# Patient Record
Sex: FEMALE | Race: WHITE | NOT HISPANIC OR LATINO | URBAN - METROPOLITAN AREA
[De-identification: names, ages, dates, MRNs, and addresses within clinical notes are randomized per-mention and may not be internally consistent; named-entity substitution may affect disease eponyms.]

---

## 2018-03-02 ENCOUNTER — OFFICE VISIT (OUTPATIENT)
Dept: URGENT CARE | Facility: CLINIC | Age: 53
End: 2018-03-02
Payer: COMMERCIAL

## 2018-03-02 VITALS
BODY MASS INDEX: 26.55 KG/M2 | DIASTOLIC BLOOD PRESSURE: 74 MMHG | WEIGHT: 165.2 LBS | HEIGHT: 66 IN | OXYGEN SATURATION: 98 % | SYSTOLIC BLOOD PRESSURE: 120 MMHG | HEART RATE: 72 BPM | RESPIRATION RATE: 16 BRPM | TEMPERATURE: 98.9 F

## 2018-03-02 DIAGNOSIS — E03.9 HYPOTHYROIDISM, UNSPECIFIED TYPE: ICD-10-CM

## 2018-03-02 PROCEDURE — 99214 OFFICE O/P EST MOD 30 MIN: CPT | Performed by: FAMILY MEDICINE

## 2018-03-02 RX ORDER — LEVOTHYROXINE SODIUM 0.07 MG/1
75 TABLET ORAL
COMMUNITY
End: 2018-03-02 | Stop reason: SDUPTHER

## 2018-03-02 RX ORDER — LEVOTHYROXINE SODIUM 0.07 MG/1
75 TABLET ORAL
Qty: 30 TAB | Refills: 3 | Status: SHIPPED | OUTPATIENT
Start: 2018-03-02 | End: 2018-03-12 | Stop reason: SDUPTHER

## 2018-03-02 ASSESSMENT — ENCOUNTER SYMPTOMS
FEVER: 0
CHILLS: 0
DIZZINESS: 0
ORTHOPNEA: 0
HEMOPTYSIS: 0
SHORTNESS OF BREATH: 0
FOCAL WEAKNESS: 0

## 2018-03-02 ASSESSMENT — PATIENT HEALTH QUESTIONNAIRE - PHQ9: CLINICAL INTERPRETATION OF PHQ2 SCORE: 0

## 2018-03-02 NOTE — PROGRESS NOTES
"Subjective:      Jacinda Pittman is a 52 y.o. female who presents with Medication Refill (levothyroxine- needs about 10 pills to hold her over, March 12th appointment with Dr Gonzalez)    Chief Complaint   Patient presents with   • Medication Refill     levothyroxine- needs about 10 pills to hold her over, March 12th appointment with Dr Gonzalez        - This is a very pleasant 52 y.o. female with complaints of getting low on thyroid med, only 2-3 days left, wont be able to see new pcp for another month. Has been doing well on current dose of synthroid           ALLERGIES:  Patient has no known allergies.     PMH:  No past medical history on file.     MEDS:    Current Outpatient Prescriptions:   •  levothyroxine (SYNTHROID) 75 MCG Tab, Take 1 Tab by mouth Every morning on an empty stomach., Disp: 30 Tab, Rfl: 3  •  PEPCID 20 MG PO TABS, 20 mg by Oral route BID (RT). Take one pill, by mouth, two times a day, Disp: , Rfl:     ** I have documented what I find to be significant in regards to past medical, social, family and surgical history  in my HPI or under PMH/PSH/FH review section, otherwise it is contributory **           HPI    Review of Systems   Constitutional: Negative for chills and fever.   Respiratory: Negative for hemoptysis and shortness of breath.    Cardiovascular: Negative for chest pain and orthopnea.   Neurological: Negative for dizziness and focal weakness.          Objective:     /74   Pulse 72   Temp 37.2 °C (98.9 °F)   Resp 16   Ht 1.676 m (5' 6\")   Wt 74.9 kg (165 lb 3.2 oz)   SpO2 98%   Breastfeeding? No   BMI 26.66 kg/m²      Physical Exam   Constitutional: She appears well-developed. No distress.   HENT:   Head: Normocephalic and atraumatic.   Neck: Neck supple.   Cardiovascular: Regular rhythm.    No murmur heard.  Pulmonary/Chest: Effort normal. No respiratory distress.   Neurological: She is alert. She exhibits normal muscle tone.   Skin: Skin is warm and dry.   Psychiatric: She " has a normal mood and affect. Judgment normal.   Nursing note and vitals reviewed.              Assessment/Plan:         1. Hypothyroidism, unspecified type  levothyroxine (SYNTHROID) 75 MCG Tab             Dx & d/c instructions discussed w/ patient and/or family members. Follow up w/ Prvt Dr or here in 3-4 days if not getting better, sooner if needed,  ER if worse and UC/PCP unavailable.        Possible side effects (i.e. Rash, GI upset/constipation, sedation, elevation of BP or sugars) of any medications given discussed.

## 2018-03-12 ENCOUNTER — OFFICE VISIT (OUTPATIENT)
Dept: MEDICAL GROUP | Facility: MEDICAL CENTER | Age: 53
End: 2018-03-12
Payer: COMMERCIAL

## 2018-03-12 VITALS
BODY MASS INDEX: 26.82 KG/M2 | HEART RATE: 73 BPM | SYSTOLIC BLOOD PRESSURE: 110 MMHG | WEIGHT: 166.89 LBS | HEIGHT: 66 IN | TEMPERATURE: 98.9 F | DIASTOLIC BLOOD PRESSURE: 62 MMHG | OXYGEN SATURATION: 96 %

## 2018-03-12 DIAGNOSIS — R73.01 IFG (IMPAIRED FASTING GLUCOSE): ICD-10-CM

## 2018-03-12 DIAGNOSIS — Z76.89 ENCOUNTER TO ESTABLISH CARE: ICD-10-CM

## 2018-03-12 DIAGNOSIS — E03.9 ACQUIRED HYPOTHYROIDISM: ICD-10-CM

## 2018-03-12 DIAGNOSIS — I34.1 MVP (MITRAL VALVE PROLAPSE): ICD-10-CM

## 2018-03-12 DIAGNOSIS — Z12.39 SCREENING FOR MALIGNANT NEOPLASM OF BREAST: ICD-10-CM

## 2018-03-12 DIAGNOSIS — Z12.11 SCREENING FOR MALIGNANT NEOPLASM OF COLON: ICD-10-CM

## 2018-03-12 DIAGNOSIS — Z00.00 HEALTH CARE MAINTENANCE: ICD-10-CM

## 2018-03-12 PROCEDURE — 99215 OFFICE O/P EST HI 40 MIN: CPT | Performed by: INTERNAL MEDICINE

## 2018-03-12 RX ORDER — LEVOTHYROXINE SODIUM 0.07 MG/1
75 TABLET ORAL
Qty: 30 TAB | Refills: 2 | Status: SHIPPED | OUTPATIENT
Start: 2018-03-12 | End: 2018-08-13 | Stop reason: SDUPTHER

## 2018-03-13 NOTE — PROGRESS NOTES
CHIEF COMPLAINT  Chief Complaint   Patient presents with   • Thyroid Problem     Levothyroxine   • Establish Care     NP     HPI  Patient is a 52 y.o. female patient who presents today for the following     - She has not seen M.D. for 2-3 years.  No recent TSH.    HYPOTHYROIDISM  DG/Onset:   Levothyroxine, 75 mcg, taking daily early in am, before any po intake.  No temperature intolerance. No change in weight,  hair/skin quality, BMs.   No tremors, weakness.  No peripheral swelling.  No mood changes.  FH: N    IFG  The patient had elevated FBG.  No polydipsia, polyphagia, polyuria.  No abdominal pain, weight loss, fatigue.  Diet: regular  Exercise: limited  FH of DM: N    Reviewed PMH, PSH, FH, SH, ALL, HCM/IMM, updated  Reviewed MEDS, updated    Patient Active Problem List    Diagnosis Date Noted   • Hypothyroidism 03/02/2018     CURRENT MEDICATIONS  Current Outpatient Prescriptions   Medication Sig Dispense Refill   • levothyroxine (SYNTHROID) 75 MCG Tab Take 1 Tab by mouth Every morning on an empty stomach. 30 Tab 2   • PEPCID 20 MG PO TABS 20 mg by Oral route BID (RT). Take one pill, by mouth, two times a day       No current facility-administered medications for this visit.      ALLERGIES  Allergies: Patient has no known allergies.  PAST MEDICAL HISTORY  No past medical history on file.  SURGICAL HISTORY  She  has no past surgical history on file.  SOCIAL HISTORY  Social History   Substance Use Topics   • Smoking status: Never Smoker   • Smokeless tobacco: Never Used   • Alcohol use No     Social History     Social History Narrative   • No narrative on file     FAMILY HISTORY  Family History   Problem Relation Age of Onset   • No Known Problems Mother    • GI Father      liver   • Thyroid Neg Hx      Family Status   Relation Status   • Mother Alive   • Father Alive   • Neg Hx      ROS   Constitutional: Negative for fever, chills.  HENT: Negative for congestion, sore throat.  Eyes: Negative for blurred vision.  "  Respiratory: Negative for cough, shortness of breath.  Cardiovascular: Negative for chest pain, palpitations.   Gastrointestinal: Negative for heartburn, nausea, abdominal pain.   Genitourinary: Negative for dysuria.  Musculoskeletal: Negative for significant myalgias, back pain and joint pain.   Skin: Negative for rash and itching.   Neuro: Negative for dizziness, weakness and headaches.   Endo/Heme/Allergies: Does not bruise/bleed easily. And per HPI.  Psychiatric/Behavioral: Negative for depression, anxiety    PHYSICAL EXAM   Blood pressure 110/62, pulse 73, temperature 37.2 °C (98.9 °F), height 1.676 m (5' 6\"), weight 75.7 kg (166 lb 14.2 oz), SpO2 96 %. Body mass index is 26.94 kg/m².  General:  NAD, well appearing  HEENT:   NC/AT, PERRLA, EOMI, TMs are clear. Oropharyngeal mucosa is pink,  without lesions;  no cervical / supraclavicular  lymphadenopathy, no thyromegaly.    Cardiovascular: RRR.   No m/r/g. No carotid bruits .      Lungs:   CTAB, no w/r/r, no respiratory distress.  Abdomen: Soft, NT/ND + BS; no suprapubic tenderness; no masses or hepatosplenomegaly.  Extremities:  2+ DP and radial pulses bilaterally.  No c/c/e.   Skin:  Warm, dry.  No erythema. No rash.   Neurologic: Alert & oriented x 3. No focal deficits.  Psychiatric:  Affect normal, mood normal, judgment normal.    LABS     Existing Labs in [facility] are reviewed and discussed with a patient    No results found for: CHOLSTRLTOT, LDL, HDL, TRIGLYCERIDE    Lab Results   Component Value Date/Time    SODIUM 139 06/15/2008 04:05 AM    POTASSIUM 3.9 06/15/2008 04:05 AM    CHLORIDE 111 06/15/2008 04:05 AM    CO2 25 06/15/2008 04:05 AM    GLUCOSE 132 (H) 06/15/2008 04:05 AM    BUN 7 (L) 06/15/2008 04:05 AM    CREATININE 1.0 06/15/2008 04:05 AM      Ref. Range 6/14/2008 07:10   TSH  0.350 - 5.500 uIU/mL 6.330 (H)     IMAGING     Reviewed the echocardiography from 6/16/2008:  IMPRESSION:  1. Technically adequate; no comparison available.  2. Premature " ventricular contractions.  3. Structurally normal heart, ejection fraction estimated at 55 to     60%.  4. Mild bileaflet mitral valve prolapse with trivial regurgitation.  5. Normal right atrial pressure.    ASSESMENT AND PLAN        1. Acquired hypothyroidism  Asymptomatic, continue current dose of levothyroxine    - levothyroxine (SYNTHROID) 75 MCG Tab; Take 1 Tab by mouth Every morning on an empty stomach.  Dispense: 30 Tab; Refill: 2    2. IFG (impaired fasting glucose)  Advise low-calorie diet, daily exercise, weight control  - COMP METABOLIC PANEL; Future  - HEMOGLOBIN A1C; Future  - MICROALBUMIN CREAT RATIO URINE; Future    3. MVP (mitral valve prolapse)  Asymptomatic    4. Health care maintenance  Pending records    5. Encounter to establish care  Reviewed PMH, PSH, FH, SH, ALL, MEDS, HCM/IMM.   Advised healthy habits, diet, exercise.    6. Screening for malignant neoplasm of breast  - MA-SCREEN MAMMO W/CAD-BILAT; Future    7. Screening for malignant neoplasm of colon  - REFERRAL TO GI FOR COLONOSCOPY    Counseling:   - Smoking:  Nonsmoker    Followup: Return in about 3 months (around 6/12/2018) for Short.    All questions are answered.    Please note that this dictation was created using voice recognition software, and that there might be errors of parth and possibly content.

## 2018-04-02 ENCOUNTER — HOSPITAL ENCOUNTER (OUTPATIENT)
Dept: RADIOLOGY | Facility: MEDICAL CENTER | Age: 53
End: 2018-04-02
Attending: INTERNAL MEDICINE
Payer: COMMERCIAL

## 2018-04-02 DIAGNOSIS — Z12.39 SCREENING FOR MALIGNANT NEOPLASM OF BREAST: ICD-10-CM

## 2018-04-02 PROCEDURE — 77067 SCR MAMMO BI INCL CAD: CPT

## 2018-06-11 ENCOUNTER — HOSPITAL ENCOUNTER (OUTPATIENT)
Dept: LAB | Facility: MEDICAL CENTER | Age: 53
End: 2018-06-11
Attending: INTERNAL MEDICINE
Payer: COMMERCIAL

## 2018-06-11 DIAGNOSIS — R73.01 IFG (IMPAIRED FASTING GLUCOSE): ICD-10-CM

## 2018-06-11 LAB
ALBUMIN SERPL BCP-MCNC: 3.7 G/DL (ref 3.2–4.9)
ALBUMIN/GLOB SERPL: 1.2 G/DL
ALP SERPL-CCNC: 50 U/L (ref 30–99)
ALT SERPL-CCNC: 12 U/L (ref 2–50)
ANION GAP SERPL CALC-SCNC: 8 MMOL/L (ref 0–11.9)
AST SERPL-CCNC: 13 U/L (ref 12–45)
BILIRUB SERPL-MCNC: 0.6 MG/DL (ref 0.1–1.5)
BUN SERPL-MCNC: 20 MG/DL (ref 8–22)
CALCIUM SERPL-MCNC: 8.9 MG/DL (ref 8.5–10.5)
CHLORIDE SERPL-SCNC: 106 MMOL/L (ref 96–112)
CO2 SERPL-SCNC: 26 MMOL/L (ref 20–33)
CREAT SERPL-MCNC: 1.05 MG/DL (ref 0.5–1.4)
CREAT UR-MCNC: 65 MG/DL
EST. AVERAGE GLUCOSE BLD GHB EST-MCNC: 126 MG/DL
GLOBULIN SER CALC-MCNC: 3.2 G/DL (ref 1.9–3.5)
GLUCOSE SERPL-MCNC: 84 MG/DL (ref 65–99)
HBA1C MFR BLD: 6 % (ref 0–5.6)
MICROALBUMIN UR-MCNC: <0.7 MG/DL
MICROALBUMIN/CREAT UR: NORMAL MG/G (ref 0–30)
POTASSIUM SERPL-SCNC: 3.9 MMOL/L (ref 3.6–5.5)
PROT SERPL-MCNC: 6.9 G/DL (ref 6–8.2)
SODIUM SERPL-SCNC: 140 MMOL/L (ref 135–145)

## 2018-06-11 PROCEDURE — 36415 COLL VENOUS BLD VENIPUNCTURE: CPT

## 2018-06-11 PROCEDURE — 80053 COMPREHEN METABOLIC PANEL: CPT

## 2018-06-11 PROCEDURE — 82043 UR ALBUMIN QUANTITATIVE: CPT

## 2018-06-11 PROCEDURE — 83036 HEMOGLOBIN GLYCOSYLATED A1C: CPT

## 2018-06-11 PROCEDURE — 82570 ASSAY OF URINE CREATININE: CPT

## 2018-06-15 ENCOUNTER — OFFICE VISIT (OUTPATIENT)
Dept: MEDICAL GROUP | Facility: MEDICAL CENTER | Age: 53
End: 2018-06-15
Payer: COMMERCIAL

## 2018-06-15 ENCOUNTER — HOSPITAL ENCOUNTER (OUTPATIENT)
Facility: MEDICAL CENTER | Age: 53
End: 2018-06-15
Attending: INTERNAL MEDICINE
Payer: COMMERCIAL

## 2018-06-15 VITALS
DIASTOLIC BLOOD PRESSURE: 64 MMHG | SYSTOLIC BLOOD PRESSURE: 108 MMHG | OXYGEN SATURATION: 95 % | HEART RATE: 80 BPM | HEIGHT: 66 IN | BODY MASS INDEX: 26.61 KG/M2 | TEMPERATURE: 98 F | WEIGHT: 165.57 LBS

## 2018-06-15 DIAGNOSIS — Z01.419 WELL FEMALE EXAM WITH ROUTINE GYNECOLOGICAL EXAM: ICD-10-CM

## 2018-06-15 DIAGNOSIS — Z12.11 SCREENING FOR MALIGNANT NEOPLASM OF COLON: ICD-10-CM

## 2018-06-15 DIAGNOSIS — E03.9 ACQUIRED HYPOTHYROIDISM: ICD-10-CM

## 2018-06-15 DIAGNOSIS — R73.01 IFG (IMPAIRED FASTING GLUCOSE): ICD-10-CM

## 2018-06-15 DIAGNOSIS — Z12.4 SCREENING FOR MALIGNANT NEOPLASM OF CERVIX: ICD-10-CM

## 2018-06-15 DIAGNOSIS — N95.1 PERIMENOPAUSE: ICD-10-CM

## 2018-06-15 DIAGNOSIS — R94.4 DECREASED GFR: ICD-10-CM

## 2018-06-15 DIAGNOSIS — I34.1 MVP (MITRAL VALVE PROLAPSE): ICD-10-CM

## 2018-06-15 PROBLEM — R79.89 ABNORMAL TSH: Status: ACTIVE | Noted: 2018-06-15

## 2018-06-15 PROCEDURE — 87624 HPV HI-RISK TYP POOLED RSLT: CPT

## 2018-06-15 PROCEDURE — 99214 OFFICE O/P EST MOD 30 MIN: CPT | Performed by: INTERNAL MEDICINE

## 2018-06-15 PROCEDURE — 88175 CYTOPATH C/V AUTO FLUID REDO: CPT

## 2018-06-16 NOTE — PROGRESS NOTES
CHIEF COMPLIANT:  Jacinda Pittman is a 53 y.o. female who presents for annual exam, accompanied by her     -They wanted to discuss about abnormal labs and MVP    Recommendations:  Regular exercise at least 4 days a week  Diet: advised balanced diet  Dental exam at least 1-2 times per year  Sunscreen use: advised    Immunizations:  TdaP:  2015    Colonoscopy: Given order  Bone density test:     GYN  Previous PAP:  normal   Abnormal PAP: no  Last Mammography: 4/2018    Menarche/perimenopause      Perimenopause, last.  It was 2 months ago, medium bleeding, no excessive cramping.  Denies hot flushes, sweating, vaginal dryness, mood swings.     HYPOTHYROIDISM  Levothyroxine, 75 mcg, taking daily early in am, before any po intake.  No temperature intolerance. No change in weight, hair/skin quality, BMs.   No tremors, weakness.  No peripheral swelling.  No mood changes.  FH: N     IFG  Internal course:  -A1c came back at 6.    No polydipsia, polyphagia, polyuria.  No abdominal pain, weight loss, fatigue.  Diet: regular  Exercise: limited  No medications.  FH of DM: N    MVP  The patient has a remote diagnosis of MVP.   She has not here palpitations, irregular heartbeats, chest pain or pressure   -Exertional shortness of breath and chest pain.   Echocardiography was remote.    Decreased GFR  New problem.     The patient had GFR 55, with normal creatinine and electrolytes.   No consistent NSAIDs use.  Low fluid intake.    CURRENT MEDICATIONS  Current Outpatient Prescriptions   Medication Sig Dispense Refill   • levothyroxine (SYNTHROID) 75 MCG Tab Take 1 Tab by mouth Every morning on an empty stomach. 30 Tab 2     No current facility-administered medications for this visit.      ALLERGIES  Allergies: Patient has no known allergies.  PAST MEDICAL HISTORY  She  has a past medical history of Hypothyroid and MVP (mitral valve prolapse) (3/12/2018). She also has no past medical history of Angina; Arrhythmia;  Arthritis; ASTHMA; Backpain; Bronchitis; CAD (coronary artery disease); Cancer (HCC); CATARACT; Congestive heart failure (HCC); COPD; Diabetes; Dialysis; Glaucoma; Hypertension; Indigestion; Infectious disease; Jaundice; Liver disease; Myocardial infarct (HCC); Other specified symptom associated with female genital organs; Pacemaker; Personal history of venous thrombosis and embolism; Pneumonia; Psychiatric disorder; Psychiatric problem; Renal disorder; Rheumatic fever; Seizure (HCC); Seizure disorder (HCC); Stroke (HCC); Unspecified disorder of thyroid; Unspecified hemorrhagic conditions; or Unspecified urinary incontinence.  SURGICAL HISTORY  She  has no past surgical history on file.  SOCIAL HISTORY  Social History   Substance Use Topics   • Smoking status: Never Smoker   • Smokeless tobacco: Never Used   • Alcohol use No     Social History     Social History Narrative   • No narrative on file     FAMILY HISTORY  Family History   Problem Relation Age of Onset   • No Known Problems Mother    • GI Father      liver   • Thyroid Neg Hx    • Diabetes Neg Hx      Family Status   Relation Status   • Mother Alive   • Father Alive   • Neg Hx      REVIEW OF SYSTEMS  Constitutional: Denies fever, chills, or sweats  Skin: negative for rash, scaling, itching, pigmentation, hair or nail changes.  Eyes: negative for visual blurring, double vision, eye pain, floaters and discharge from eyes  ENT: negative for tinnitus, vertigo, bleeding gums, dental problem and hoarseness, frequent URI's, sinus trouble, persistent sore throat  Respiratory: negative for persistent cough, hemoptysis, dyspnea, recurrent pneumonia or bronchitis, asthma and wheezing  Cardiovascular: negative for palpitations, tachycardia, irregular heart beat, chest pain, or peripheral edema.  Breast: Denies breast tenderness, mass, discharge, changes in size or contour, or abnormal cyclic discomfort.  Gastrointestinal: negative for poor appetite, dysphagia, nausea,  "heartburn or reflux, abdominal pain, hemorrhoids, constipation or diarrhea  Genitourinary: negative for dysuria, frequency, hesitancy, incontinence, sexually transmitted disease, abnormal vaginal discharge/bleeding, dysparunia   Musculoskeletal: negative for joint swelling and muscle pain/ soreness  Neuro: negative for migraine headaches, involuntary movements or tremor  Psychiatric: negative for excessive alcohol consumption or illegal drug use, sleep problems, anxiety, depression, sexual difficulties  Hematologic/Lymphatic/Immunologic: negative for anemia, unusual bruising, swollen glands, HIV risk factors  Endocrine: negative for temperature intolerance, polydipsia, polyuria.     PHYSICAL EXAMINATION:  Blood pressure 108/64, pulse 80, temperature 36.7 °C (98 °F), height 1.676 m (5' 6\"), weight 75.1 kg (165 lb 9.1 oz), SpO2 95 %.  Body mass index is 26.72 kg/m².  Wt Readings from Last 4 Encounters:   06/15/18 75.1 kg (165 lb 9.1 oz)   03/12/18 75.7 kg (166 lb 14.2 oz)   03/02/18 74.9 kg (165 lb 3.2 oz)   06/15/08 58.5 kg (129 lb)     General appearance:healthy, well developed, well nourished, well-groomed  Psych: alert, no distress, cooperative. Eye contact good, speech goal directed. Affect calm.   Eyes: conjunctivae and sclerae normal, lids and lashes normal, EOMI, PERRLA  ENT: Ears: external ears normal to inspection, canals clear. TMs pearly gray with normal light reflex and anatomic landmarks, Nose/Sinuses: nose shows no deformity, asymmetry, or inflammation, nasal mucosa normal, no sinus tenderness,   Oropharynx: lips normal without lesions, buccal mucosa normal, gums healthy, teeth intact, non-carious, palate normal, tongue midline and normal  Neck: no asymmetry, masses, adenopathy. Thyroid normal to palpation, carotids without bruits, no jugular venous distention  Lungs: clear to auscultation with good excursion, Normal respiratory rate. Chest symmetric no chest wall tenderness.  Cardiovascular: Regular " rate and rhythm, no murmur.  No peripheral edema  Abdomen:  Soft, non-tender, no masses, HSM or palpable renal abnormalities. Bowel sounds normal, no bruits heard. No CVAT.  Musculoskeletal: no evidence of joint effusion, ROM of all joints is normal, no crepitation detected, strength grossly normal UE and LE, proximal and distal motor groups. No deformities present  Lymphatic: None significantly enlarged supraclavicular, axillary, or inguinal  Skin: color normal, vascularity normal, no rashes or suspicious lesions, no evidence of bleeding or bruising  Neuro: speech normal, mental status intact, gait grossly normal, muscle tone normal.  GYN: No suprapubic tenderness.  Perineum and external genitalia normal without rash.   Vagina with without discharge, normal mucosa.  Cervix w/o visible lesions or discharge. No CMT  Uterus normal size, non-tender, no masses,   Adnexa w/o mass or tenderness.   PAP smear was obtained.    LABS    Reviewed and discussed:    No results found for: CHOLSTRLTOT, LDL, HDL, TRIGLYCERIDE    Lab Results   Component Value Date/Time    SODIUM 140 06/11/2018 08:34 AM    POTASSIUM 3.9 06/11/2018 08:34 AM    CHLORIDE 106 06/11/2018 08:34 AM    CO2 26 06/11/2018 08:34 AM    GLUCOSE 84 06/11/2018 08:34 AM    BUN 20 06/11/2018 08:34 AM    CREATININE 1.05 06/11/2018 08:34 AM    CREATININE 1.0 06/15/2008 04:05 AM     Lab Results   Component Value Date/Time    ALKPHOSPHAT 50 06/11/2018 08:34 AM    ASTSGOT 13 06/11/2018 08:34 AM    ALTSGPT 12 06/11/2018 08:34 AM    TBILIRUBIN 0.6 06/11/2018 08:34 AM      Lab Results   Component Value Date/Time    HBA1C 6.0 (H) 06/11/2018 08:34 AM     Lab Results   Component Value Date/Time    WBC 7.4 06/15/2008 04:05 AM    RBC 3.65 (L) 06/15/2008 04:05 AM    HEMOGLOBIN 11.6 (L) 06/15/2008 04:05 AM    HEMATOCRIT 34.2 (L) 06/15/2008 04:05 AM    MCV 93.8 06/15/2008 04:05 AM    MCH 31.8 06/15/2008 04:05 AM    MCHC 33.9 06/15/2008 04:05 AM    MPV 8.4 06/15/2008 04:05 AM     NEUTSPOLYS 78.4 (H) 06/14/2008 07:10 AM    LYMPHOCYTES 15.0 (L) 06/14/2008 07:10 AM    MONOCYTES 5.4 06/14/2008 07:10 AM    EOSINOPHILS 0.5 06/14/2008 07:10 AM    BASOPHILS 0.7 06/14/2008 07:10 AM      ASSESSMENT/PLAN:    1. Well female exam with routine gynecological exam  - THINPREP PAP WITH HPV; Future    2. Screening for malignant neoplasm of cervix  - THINPREP PAP WITH HPV; Future    3. Screening for malignant neoplasm of colon  Advised to decide which screening to choose:  - REFERRAL TO GI FOR COLONOSCOPY  - OCCULT BLOOD FECES IMMUNOASSAY (FIT); Future    4. Perimenopause  Explain natural course in perimenopause/menopause.   No postmenopausal symptoms    5. Acquired hypothyroidism  Asymptomatic, pending TSH, continue current dose of levothyroxine  - TSH; Future    6. IFG (impaired fasting glucose)  Discussed about risk to develop DM.   Advised low carb diet, exercise, watch for WT.     - COMP METABOLIC PANEL; Future  - HEMOGLOBIN A1C; Future    7. MVP (mitral valve prolapse)  Asymptomatic, pending  - ECHOCARDIOGRAM COMP W/O CONT; Future    8. Decreased GFR  Avoid nephrotoxins, such as NSAIDs.   Advised fluid intake to at least 30 ounces per day.   Renal panel will be followed.     Smoking Counseling: Nonsmoker    Next office visit is due in 4 months    All questions are answered.     Please note that this dictation was created using voice recognition software. Despite all efforts, some minor grammar mistakes are possible.

## 2018-06-18 DIAGNOSIS — Z01.419 WELL FEMALE EXAM WITH ROUTINE GYNECOLOGICAL EXAM: ICD-10-CM

## 2018-06-18 DIAGNOSIS — Z12.4 SCREENING FOR MALIGNANT NEOPLASM OF CERVIX: ICD-10-CM

## 2018-06-20 LAB
CYTOLOGY REG CYTOL: NORMAL
HPV HR 12 DNA CVX QL NAA+PROBE: NEGATIVE
HPV16 DNA SPEC QL NAA+PROBE: NEGATIVE
HPV18 DNA SPEC QL NAA+PROBE: NEGATIVE
SPECIMEN SOURCE: NORMAL

## 2018-08-11 ENCOUNTER — PATIENT MESSAGE (OUTPATIENT)
Dept: MEDICAL GROUP | Facility: MEDICAL CENTER | Age: 53
End: 2018-08-11

## 2018-08-11 DIAGNOSIS — E03.9 ACQUIRED HYPOTHYROIDISM: ICD-10-CM

## 2018-08-13 RX ORDER — LEVOTHYROXINE SODIUM 0.07 MG/1
75 TABLET ORAL
Qty: 90 TAB | Refills: 0 | Status: SHIPPED | OUTPATIENT
Start: 2018-08-13 | End: 2018-11-12 | Stop reason: SDUPTHER

## 2018-11-12 ENCOUNTER — OFFICE VISIT (OUTPATIENT)
Dept: MEDICAL GROUP | Facility: MEDICAL CENTER | Age: 53
End: 2018-11-12
Payer: COMMERCIAL

## 2018-11-12 VITALS
TEMPERATURE: 98.2 F | WEIGHT: 166.67 LBS | HEIGHT: 66 IN | BODY MASS INDEX: 26.79 KG/M2 | DIASTOLIC BLOOD PRESSURE: 72 MMHG | RESPIRATION RATE: 14 BRPM | OXYGEN SATURATION: 96 % | HEART RATE: 82 BPM | SYSTOLIC BLOOD PRESSURE: 122 MMHG

## 2018-11-12 DIAGNOSIS — E03.9 ACQUIRED HYPOTHYROIDISM: ICD-10-CM

## 2018-11-12 DIAGNOSIS — I34.1 MVP (MITRAL VALVE PROLAPSE): ICD-10-CM

## 2018-11-12 DIAGNOSIS — R94.4 DECREASED GFR: ICD-10-CM

## 2018-11-12 DIAGNOSIS — Z00.00 HEALTH CARE MAINTENANCE: ICD-10-CM

## 2018-11-12 DIAGNOSIS — R73.01 IFG (IMPAIRED FASTING GLUCOSE): ICD-10-CM

## 2018-11-12 PROCEDURE — 99214 OFFICE O/P EST MOD 30 MIN: CPT | Performed by: INTERNAL MEDICINE

## 2018-11-12 RX ORDER — LEVOTHYROXINE SODIUM 0.07 MG/1
75 TABLET ORAL
Qty: 30 TAB | Refills: 0 | Status: SHIPPED | OUTPATIENT
Start: 2018-11-12 | End: 2018-12-31 | Stop reason: SDUPTHER

## 2018-11-13 NOTE — PROGRESS NOTES
CHIEF COMPLAINT  Chief Complaint   Patient presents with   • Follow-Up     Labs     HPI  Patient is a 53 y.o. female patient who presents today for the following     Hypothyroidism  Levothyroxine, 75 mcg, taking daily early in am, before any po intake.  No temperature intolerance. No change in weight, hair/skin quality, BMs.   No tremors, weakness.  No peripheral swelling.  No mood changes.  FH: N     IFG  The patient had elevated FBG.  No polydipsia, polyphagia, polyuria.  No abdominal pain, weight loss, fatigue.  Diet: regular  Exercise: limited  BMI: 26  FH of DM: Neg    MVP  The patient has a remote diagnosis of MVP.   She has not here palpitations, irregular heartbeats, chest pain or pressure              -Exertional shortness of breath and chest pain.   Echocardiography was remote.     Decreased GFR  The patient had slightly decreased GFR, with normal creatinine and electrolytes.   No consistent NSAIDs use.  Low fluid intake.     Reviewed PMH, PSH, FH, SH, ALL, HCM/IMM, no changes  Reviewed MEDS, no changes    Patient Active Problem List    Diagnosis Date Noted   • Acquired hypothyroidism 11/12/2018   • Health care maintenance 11/12/2018   • Perimenopause 06/15/2018   • Abnormal TSH 06/15/2018   • IFG (impaired fasting glucose) 03/12/2018   • MVP (mitral valve prolapse) 03/12/2018     CURRENT MEDICATIONS  Current Outpatient Prescriptions   Medication Sig Dispense Refill   • levothyroxine (SYNTHROID) 75 MCG Tab Take 1 Tab by mouth Every morning on an empty stomach. 90 Tab 0     No current facility-administered medications for this visit.      ALLERGIES  Allergies: Patient has no known allergies.  PAST MEDICAL HISTORY  Past Medical History:   Diagnosis Date   • MVP (mitral valve prolapse) 3/12/2018   • Hypothyroid      SURGICAL HISTORY  She  has no past surgical history on file.  SOCIAL HISTORY  Social History   Substance Use Topics   • Smoking status: Never Smoker   • Smokeless tobacco: Never Used   • Alcohol  "use No     Social History     Social History Narrative   • No narrative on file     FAMILY HISTORY  Family History   Problem Relation Age of Onset   • No Known Problems Mother    • GI Father         liver   • Thyroid Neg Hx    • Diabetes Neg Hx      Family Status   Relation Status   • Mo Alive   • Fa Alive   • Neg Hx (Not Specified)     ROS   Constitutional: Negative for fever, chills.  HENT: Negative for congestion, sore throat.  Eyes: Negative for blurred vision.   Respiratory: Negative for cough, shortness of breath.  Cardiovascular: Negative for chest pain, palpitations. And per HPI.  Gastrointestinal: Negative for heartburn, nausea, abdominal pain.   Genitourinary: Negative for dysuria.  Musculoskeletal: Negative for significant myalgias, back pain and joint pain.   Skin: Negative for rash and itching.   Neuro: Negative for dizziness, weakness and headaches.   Endo/Heme/Allergies: Does not bruise/bleed easily. And per HPI.  Psychiatric/Behavioral: Negative for depression, anxiety    PHYSICAL EXAM   Blood pressure 122/72, pulse 82, temperature 36.8 °C (98.2 °F), temperature source Temporal, resp. rate 14, height 1.676 m (5' 5.98\"), weight 75.6 kg (166 lb 10.7 oz), SpO2 96 %, not currently breastfeeding. Body mass index is 26.91 kg/m².  General:  NAD, well appearing  HEENT:   NC/AT, PERRLA, EOMI, TMs are clear. Oropharyngeal mucosa is pink,  without lesions;  no cervical / supraclavicular  lymphadenopathy, no thyromegaly.    Cardiovascular: RRR.   No m/r/g. No carotid bruits .      Lungs:   CTAB, no w/r/r, no respiratory distress.  Abdomen: Soft, NT/ND + BS; no suprapubic tenderness; no masses or hepatosplenomegaly.  Extremities:  2+ DP and radial pulses bilaterally.  No c/c/e.   Skin:  Warm, dry.  No erythema. No rash.   Neurologic: Alert & oriented x 3. No focal deficits.  Psychiatric:  Affect normal, mood normal, judgment normal.    LABS     Labs are reviewed and discussed with a patient      Lab Results "   Component Value Date/Time    SODIUM 140 06/11/2018 08:34 AM    POTASSIUM 3.9 06/11/2018 08:34 AM    CHLORIDE 106 06/11/2018 08:34 AM    CO2 26 06/11/2018 08:34 AM    GLUCOSE 84 06/11/2018 08:34 AM    BUN 20 06/11/2018 08:34 AM    CREATININE 1.05 06/11/2018 08:34 AM    CREATININE 1.0 06/15/2008 04:05 AM     Lab Results   Component Value Date/Time    ALKPHOSPHAT 50 06/11/2018 08:34 AM    ASTSGOT 13 06/11/2018 08:34 AM    ALTSGPT 12 06/11/2018 08:34 AM    TBILIRUBIN 0.6 06/11/2018 08:34 AM      Lab Results   Component Value Date/Time    HBA1C 6.0 (H) 06/11/2018 08:34 AM     Lab Results   Component Value Date/Time    WBC 7.4 06/15/2008 04:05 AM    RBC 3.65 (L) 06/15/2008 04:05 AM    HEMOGLOBIN 11.6 (L) 06/15/2008 04:05 AM    HEMATOCRIT 34.2 (L) 06/15/2008 04:05 AM    MCV 93.8 06/15/2008 04:05 AM    MCH 31.8 06/15/2008 04:05 AM    MCHC 33.9 06/15/2008 04:05 AM    MPV 8.4 06/15/2008 04:05 AM    NEUTSPOLYS 78.4 (H) 06/14/2008 07:10 AM    LYMPHOCYTES 15.0 (L) 06/14/2008 07:10 AM    MONOCYTES 5.4 06/14/2008 07:10 AM    EOSINOPHILS 0.5 06/14/2008 07:10 AM    BASOPHILS 0.7 06/14/2008 07:10 AM      IMAGING     None    ASSESMENT AND PLAN        1. Acquired hypothyroidism  Pending labs, continue current treatment  - levothyroxine (SYNTHROID) 75 MCG Tab; Take 1 Tab by mouth Every morning on an empty stomach.  Dispense: 30 Tab; Refill: 0  - FREE THYROXINE; Future    2. IFG (impaired fasting glucose)  Pending labs.   Discussed about risk to develop DM.   Advised low carb diet, exercise, watch for WT.     3. MVP (mitral valve prolapse)  Asymptomatic will be followed up    4. Decreased GFR  Advised to increase fluid intake to more than 30 ounces a day, avoid NSAIDs.    5. Health care maintenance  Advised a flu shot.    Counseling:   - Smoking:  Nonsmoker    Followup: 6 weeks with labs    All questions are answered.    Please note that this dictation was created using voice recognition software, and that there might be errors of  parth and possibly content.

## 2018-11-19 ENCOUNTER — HOSPITAL ENCOUNTER (OUTPATIENT)
Dept: LAB | Facility: MEDICAL CENTER | Age: 53
End: 2018-11-19
Attending: INTERNAL MEDICINE
Payer: COMMERCIAL

## 2018-11-19 DIAGNOSIS — R73.01 IFG (IMPAIRED FASTING GLUCOSE): ICD-10-CM

## 2018-11-19 DIAGNOSIS — E03.9 ACQUIRED HYPOTHYROIDISM: ICD-10-CM

## 2018-11-19 LAB
EST. AVERAGE GLUCOSE BLD GHB EST-MCNC: 140 MG/DL
HBA1C MFR BLD: 6.5 % (ref 0–5.6)

## 2018-11-19 PROCEDURE — 80053 COMPREHEN METABOLIC PANEL: CPT

## 2018-11-19 PROCEDURE — 84443 ASSAY THYROID STIM HORMONE: CPT

## 2018-11-19 PROCEDURE — 84439 ASSAY OF FREE THYROXINE: CPT

## 2018-11-19 PROCEDURE — 36415 COLL VENOUS BLD VENIPUNCTURE: CPT

## 2018-11-19 PROCEDURE — 83036 HEMOGLOBIN GLYCOSYLATED A1C: CPT

## 2018-11-20 LAB
ALBUMIN SERPL BCP-MCNC: 4 G/DL (ref 3.2–4.9)
ALBUMIN/GLOB SERPL: 1.2 G/DL
ALP SERPL-CCNC: 53 U/L (ref 30–99)
ALT SERPL-CCNC: 12 U/L (ref 2–50)
ANION GAP SERPL CALC-SCNC: 8 MMOL/L (ref 0–11.9)
AST SERPL-CCNC: 14 U/L (ref 12–45)
BILIRUB SERPL-MCNC: 0.4 MG/DL (ref 0.1–1.5)
BUN SERPL-MCNC: 25 MG/DL (ref 8–22)
CALCIUM SERPL-MCNC: 9.5 MG/DL (ref 8.5–10.5)
CHLORIDE SERPL-SCNC: 107 MMOL/L (ref 96–112)
CO2 SERPL-SCNC: 27 MMOL/L (ref 20–33)
CREAT SERPL-MCNC: 1.23 MG/DL (ref 0.5–1.4)
GLOBULIN SER CALC-MCNC: 3.3 G/DL (ref 1.9–3.5)
GLUCOSE SERPL-MCNC: 93 MG/DL (ref 65–99)
POTASSIUM SERPL-SCNC: 4 MMOL/L (ref 3.6–5.5)
PROT SERPL-MCNC: 7.3 G/DL (ref 6–8.2)
SODIUM SERPL-SCNC: 142 MMOL/L (ref 135–145)
T4 FREE SERPL-MCNC: 1.06 NG/DL (ref 0.53–1.43)
TSH SERPL DL<=0.005 MIU/L-ACNC: 0.38 UIU/ML (ref 0.38–5.33)

## 2018-12-31 ENCOUNTER — OFFICE VISIT (OUTPATIENT)
Dept: MEDICAL GROUP | Facility: MEDICAL CENTER | Age: 53
End: 2018-12-31
Payer: COMMERCIAL

## 2018-12-31 VITALS
HEART RATE: 78 BPM | BODY MASS INDEX: 26.36 KG/M2 | DIASTOLIC BLOOD PRESSURE: 70 MMHG | SYSTOLIC BLOOD PRESSURE: 124 MMHG | OXYGEN SATURATION: 92 % | RESPIRATION RATE: 14 BRPM | WEIGHT: 164.02 LBS | HEIGHT: 66 IN | TEMPERATURE: 98.8 F

## 2018-12-31 DIAGNOSIS — I34.1 MVP (MITRAL VALVE PROLAPSE): ICD-10-CM

## 2018-12-31 DIAGNOSIS — R73.01 IFG (IMPAIRED FASTING GLUCOSE): ICD-10-CM

## 2018-12-31 DIAGNOSIS — R94.4 DECREASED GFR: ICD-10-CM

## 2018-12-31 DIAGNOSIS — E03.9 ACQUIRED HYPOTHYROIDISM: ICD-10-CM

## 2018-12-31 DIAGNOSIS — D64.9 ANEMIA, UNSPECIFIED TYPE: ICD-10-CM

## 2018-12-31 PROBLEM — R79.89 ABNORMAL TSH: Status: RESOLVED | Noted: 2018-06-15 | Resolved: 2018-12-31

## 2018-12-31 PROBLEM — K63.5 POLYP OF COLON: Status: ACTIVE | Noted: 2018-12-31

## 2018-12-31 PROCEDURE — 99214 OFFICE O/P EST MOD 30 MIN: CPT | Performed by: INTERNAL MEDICINE

## 2018-12-31 RX ORDER — LEVOTHYROXINE SODIUM 0.07 MG/1
75 TABLET ORAL
Qty: 30 TAB | Refills: 0 | Status: CANCELLED | OUTPATIENT
Start: 2018-12-31

## 2018-12-31 RX ORDER — LEVOTHYROXINE SODIUM 0.07 MG/1
75 TABLET ORAL
Qty: 90 TAB | Refills: 1 | Status: SHIPPED | OUTPATIENT
Start: 2018-12-31 | End: 2019-04-15 | Stop reason: SDUPTHER

## 2018-12-31 NOTE — PROGRESS NOTES
CHIEF COMPLAINT  Chief Complaint   Patient presents with   • Follow-Up   • Results     Labs   IFG    HPI  Patient is a 53 y.o. female patient who presents today for the following     Hypothyroidism  Levothyroxine, 75 mcg, taking daily early in am, before any po intake.  No temperature intolerance. No change in weight, hair/skin quality, BMs.   No tremors, weakness.  No peripheral swelling.  No mood changes.  FH: N     IFG  The patient had elevated FBG.  No polydipsia, polyphagia, polyuria.  No abdominal pain, weight loss, fatigue.  Diet: regular  Exercise: limited  BMI: 26  FH of DM: Neg    Decreased GFR  The patient had slightly decreased GFR, with normal creatinine and electrolytes.   No consistent NSAIDs use.  Low fluid intake.    Anemia  The patient had had low Hgb, with normal MCV.   On regular diet.  Renal function became recently abnormal.  Denies fatigue, lightheadedness or dizziness.    Reviewed PMH, PSH, FH, SH, ALL, HCM/IMM, no changes  Reviewed MEDS, no changes    Patient Active Problem List    Diagnosis Date Noted   • Acquired hypothyroidism 11/12/2018   • Health care maintenance 11/12/2018   • Perimenopause 06/15/2018   • IFG (impaired fasting glucose) 03/12/2018   • MVP (mitral valve prolapse) 03/12/2018     CURRENT MEDICATIONS  Current Outpatient Prescriptions   Medication Sig Dispense Refill   • metFORMIN (GLUCOPHAGE) 500 MG Tab Take 1.5 Tabs by mouth 2 times a day, with meals. 225 Tab 1   • levothyroxine (SYNTHROID) 75 MCG Tab Take 1 Tab by mouth Every morning on an empty stomach. 30 Tab 0     No current facility-administered medications for this visit.      ALLERGIES  Allergies: Patient has no known allergies.  PAST MEDICAL HISTORY  Past Medical History:   Diagnosis Date   • MVP (mitral valve prolapse) 3/12/2018   • Hypothyroid      SURGICAL HISTORY  She  has no past surgical history on file.  SOCIAL HISTORY  Social History   Substance Use Topics   • Smoking status: Never Smoker   • Smokeless  "tobacco: Never Used   • Alcohol use No     Social History     Social History Narrative   • No narrative on file     FAMILY HISTORY  Family History   Problem Relation Age of Onset   • No Known Problems Mother    • GI Father         liver   • Thyroid Neg Hx    • Diabetes Neg Hx      Family Status   Relation Status   • Mo Alive   • Fa Alive   • Neg Hx (Not Specified)       ROS   Constitutional: Negative for fatigue.  HENT: Negative for congestion.  Eyes: Negative for blurred vision.   Respiratory: Negative for cough, shortness of breath.  Cardiovascular: Negative for chest pain, palpitations.   Gastrointestinal: Negative for heartburn, abdominal pain.   Genitourinary: Negative for urinary problems.  Musculoskeletal: Negative for muscle, back or joint pain.   Skin: Negative for rash.   Neuro: Negative for dizziness, headaches.   Endo/Heme/Allergies: Does not bruise/bleed easily. And per HPI.  Psychiatric/Behavioral: Negative for depression.    PHYSICAL EXAM   Blood pressure 124/70, pulse 78, temperature 37.1 °C (98.8 °F), temperature source Temporal, resp. rate 14, height 1.676 m (5' 5.98\"), weight 74.4 kg (164 lb 0.4 oz), SpO2 92 %, not currently breastfeeding. Body mass index is 26.49 kg/m².  General:  NAD, well appearing  HEENT:   NC/AT, PERRLA, EOMI, TMs are clear. Oropharyngeal mucosa is pink,  without lesions;  no cervical / supraclavicular  lymphadenopathy, no thyromegaly.    Cardiovascular: RRR.   No m/r/g. No carotid bruits .      Lungs:   CTAB, no w/r/r, no respiratory distress.  Abdomen: Soft, NT/ND + BS.  Extremities:  2+ DP and radial pulses bilaterally.  No c/c/e.   Skin:  Warm, dry.  No erythema. No rash.   Neurologic: Alert & oriented x 3.  No focal deficits.  Psychiatric:  Affect normal, mood normal, judgment normal.    LABS     Labs are reviewed and discussed with a patient  No results found for: CHOLSTRLTOT, LDL, HDL, TRIGLYCERIDE    Lab Results   Component Value Date/Time    SODIUM 142 11/19/2018 " 06:41 AM    POTASSIUM 4.0 11/19/2018 06:41 AM    CHLORIDE 107 11/19/2018 06:41 AM    CO2 27 11/19/2018 06:41 AM    GLUCOSE 93 11/19/2018 06:41 AM    BUN 25 (H) 11/19/2018 06:41 AM    CREATININE 1.23 11/19/2018 06:41 AM    CREATININE 1.0 06/15/2008 04:05 AM     Lab Results   Component Value Date/Time    ALKPHOSPHAT 53 11/19/2018 06:41 AM    ASTSGOT 14 11/19/2018 06:41 AM    ALTSGPT 12 11/19/2018 06:41 AM    TBILIRUBIN 0.4 11/19/2018 06:41 AM      Lab Results   Component Value Date/Time    HBA1C 6.5 (H) 11/19/2018 06:41 AM    HBA1C 6.0 (H) 06/11/2018 08:34 AM     No results found for: TSH  Lab Results   Component Value Date/Time    FREET4 1.06 11/19/2018 06:41 AM      Ref. Range 11/19/2018   TSH  0.380 - 5.330 uIU/mL 0.380   Free T-4 0.53 - 1.43 ng/dL 1.06     Lab Results   Component Value Date/Time    WBC 7.4 06/15/2008 04:05 AM    RBC 3.65 (L) 06/15/2008 04:05 AM    HEMOGLOBIN 11.6 (L) 06/15/2008 04:05 AM    HEMATOCRIT 34.2 (L) 06/15/2008 04:05 AM    MCV 93.8 06/15/2008 04:05 AM    MCH 31.8 06/15/2008 04:05 AM    MCHC 33.9 06/15/2008 04:05 AM    MPV 8.4 06/15/2008 04:05 AM    NEUTSPOLYS 78.4 (H) 06/14/2008 07:10 AM    LYMPHOCYTES 15.0 (L) 06/14/2008 07:10 AM    MONOCYTES 5.4 06/14/2008 07:10 AM    EOSINOPHILS 0.5 06/14/2008 07:10 AM    BASOPHILS 0.7 06/14/2008 07:10 AM      IMAGING     None    ASSESMENT AND PLAN        1. Acquired hypothyroidism  Controlled, continue current treatment  - TSH; Future  - FREE THYROXINE; Future  - levothyroxine (SYNTHROID) 75 MCG Tab; Take 1 Tab by mouth Every morning on an empty stomach.  Dispense: 90 Tab; Refill: 1    2. IFG (impaired fasting glucose)  Discussed about risk to develop DM.   Advised low carb diet, exercise, watch for WT.     - metFORMIN (GLUCOPHAGE) 500 MG Tab; Take 1.5 Tabs by mouth 2 times a day, with meals.  Dispense: 225 Tab; Refill: 1  - COMP METABOLIC PANEL; Future  - HEMOGLOBIN A1C; Future  - MICROALBUMIN CREAT RATIO URINE; Future    3. Decreased GFR  Advised to  avoid NSAIDs, have good fluid intake.  - COMP METABOLIC PANEL; Future    4. Anemia, unspecified type  Follow-up labs  - CBC WITH DIFFERENTIAL; Future    5. MVP (mitral valve prolapse)  Asymptomatic.    Counseling:   - Smoking:  Nonsmoker    Followup: Return in about 3 months (around 3/31/2019).    All questions are answered.    Please note that this dictation was created using voice recognition software, and that there might be errors of parth and possibly content.

## 2019-04-01 ENCOUNTER — APPOINTMENT (OUTPATIENT)
Dept: MEDICAL GROUP | Facility: MEDICAL CENTER | Age: 54
End: 2019-04-01

## 2019-04-15 ENCOUNTER — OFFICE VISIT (OUTPATIENT)
Dept: MEDICAL GROUP | Facility: MEDICAL CENTER | Age: 54
End: 2019-04-15
Payer: COMMERCIAL

## 2019-04-15 VITALS
HEIGHT: 66 IN | TEMPERATURE: 98.6 F | BODY MASS INDEX: 24.09 KG/M2 | WEIGHT: 149.91 LBS | OXYGEN SATURATION: 96 % | SYSTOLIC BLOOD PRESSURE: 118 MMHG | DIASTOLIC BLOOD PRESSURE: 72 MMHG | HEART RATE: 85 BPM

## 2019-04-15 DIAGNOSIS — R73.01 IFG (IMPAIRED FASTING GLUCOSE): ICD-10-CM

## 2019-04-15 DIAGNOSIS — E03.9 ACQUIRED HYPOTHYROIDISM: ICD-10-CM

## 2019-04-15 DIAGNOSIS — I34.1 MVP (MITRAL VALVE PROLAPSE): ICD-10-CM

## 2019-04-15 DIAGNOSIS — Z00.00 HEALTH CARE MAINTENANCE: ICD-10-CM

## 2019-04-15 DIAGNOSIS — Z00.00 ANNUAL PHYSICAL EXAM: ICD-10-CM

## 2019-04-15 DIAGNOSIS — K63.5 POLYP OF COLON, UNSPECIFIED PART OF COLON, UNSPECIFIED TYPE: ICD-10-CM

## 2019-04-15 DIAGNOSIS — N95.1 PERIMENOPAUSE: ICD-10-CM

## 2019-04-15 PROBLEM — R94.4 DECREASED GFR: Status: RESOLVED | Noted: 2018-12-31 | Resolved: 2019-04-15

## 2019-04-15 PROCEDURE — 99214 OFFICE O/P EST MOD 30 MIN: CPT | Performed by: INTERNAL MEDICINE

## 2019-04-15 RX ORDER — LEVOTHYROXINE SODIUM 0.07 MG/1
75 TABLET ORAL
Qty: 90 TAB | Refills: 1 | Status: SHIPPED | OUTPATIENT
Start: 2019-04-15 | End: 2019-07-29

## 2019-04-15 ASSESSMENT — PATIENT HEALTH QUESTIONNAIRE - PHQ9: CLINICAL INTERPRETATION OF PHQ2 SCORE: 0

## 2019-04-15 NOTE — PROGRESS NOTES
CHIEF COMPLIANT:  Jacinda Pittman is a 54 y.o. female who presents for annual exam    Recommendations:  Regular exercise at least 4 days a week  Diet: advised balanced diet  Dental exam at least 1-2 times per year  Sunscreen use: advised     Immunizations:  TdaP: 2015     Colonoscopy: Given order  Bone density test:      GYN  Previous PAP: normal   Abnormal PAP: no  Last Mammography: 4/2018     Menarche/perimenopause   Perimenopause, last. It was 2 months ago, medium bleeding, no excessive cramping.  Denies hot flushes, sweating, vaginal dryness, mood swings.      Hypothyroidism  Levothyroxine, 75 mcg, taking daily early in am, before any po intake.  No temperature intolerance. No change in weight, hair/skin quality, BMs.   No tremors, weakness.  No peripheral swelling.  No mood changes.  FH: N     IFG  Internal course:  -A1c 5.9, was 6.5.     On metformin 750 mg BID  No polydipsia, polyphagia, polyuria.  No abdominal pain, weight loss, fatigue.  Diet: regular  Exercise: limited  FH of DM: Neg     MVP  The patient has a remote diagnosis of MVP.   She has not here palpitations, irregular heartbeats, chest pain or pressure              -Exertional shortness of breath and chest pain.   Echocardiography was remote.     Colon polyp  Colonoscopy 7/30/18, requested follow-up after 5 years.    CURRENT MEDICATIONS  Current Outpatient Prescriptions   Medication Sig Dispense Refill   • levothyroxine (SYNTHROID) 75 MCG Tab Take 1 Tab by mouth Every morning on an empty stomach. 90 Tab 1   • metFORMIN (GLUCOPHAGE) 500 MG Tab Take 1 Tab by mouth 2 times a day, with meals. 180 Tab 1     No current facility-administered medications for this visit.      ALLERGIES  Allergies: Patient has no known allergies.  PAST MEDICAL HISTORY  She  has a past medical history of Hypothyroid and MVP (mitral valve prolapse) (3/12/2018). She also has no past medical history of Angina; Arrhythmia; Arthritis; ASTHMA; Backpain; Bronchitis; CAD  (coronary artery disease); Cancer (HCC); CATARACT; Congestive heart failure (HCC); COPD; Diabetes; Dialysis; Glaucoma; Hypertension; Indigestion; Infectious disease; Jaundice; Liver disease; Myocardial infarct (HCC); Other specified symptom associated with female genital organs; Pacemaker; Personal history of venous thrombosis and embolism; Pneumonia; Psychiatric disorder; Psychiatric problem; Renal disorder; Rheumatic fever; Seizure (HCC); Seizure disorder (HCC); Stroke (HCC); Unspecified disorder of thyroid; Unspecified hemorrhagic conditions; or Unspecified urinary incontinence.  SURGICAL HISTORY  She  has no past surgical history on file.  SOCIAL HISTORY  Social History   Substance Use Topics   • Smoking status: Never Smoker   • Smokeless tobacco: Never Used   • Alcohol use No     Social History     Social History Narrative   • No narrative on file     FAMILY HISTORY  Family History   Problem Relation Age of Onset   • No Known Problems Mother    • GI Father         liver   • Thyroid Neg Hx    • Diabetes Neg Hx      Family Status   Relation Status   • Mo Alive   • Fa Alive   • Neg Hx (Not Specified)     REVIEW OF SYSTEMS  Constitutional: Denies fever, chills, sweats, fatigue.  Skin: negative for rash, scaling, itching, pigmentation, hair or nail changes.  Eyes: negative for blurred or double vision, eye pain, floaters and discharge from eyes.  ENT: negative for tinnitus, vertigo, dental problem and hoarseness, frequent URI's, sinus problems/pain.  Respiratory: negative for persistent cough, hemoptysis, dyspnea, wheezing, SOB.  Cardiovascular: negative for palpitations, tachycardia, irregular heart beats, chest pain, or peripheral edema.  Gastrointestinal: negative for anorexia, dysphagia, nausea, heartburn/reflux, abdominal pain, hemorrhoids, constipation or diarrhea.  Genitourinary: negative for dysuria, frequency, hesitancy, incontinence, abnormal vaginal discharge/bleeding.  Musculoskeletal: negative for  "back/joint pain, myalgia.   Neuro: negative for migraine headaches, involuntary movements or tremor  Psychiatric: negative for excessive alcohol use, illegal drug use, sleep problems, anxiety, depression.  Hematologic/Lymphatic/Immunologic: negative for anemia, unusual bruising, swollen glands.  Endocrine: negative for temperature intolerance, polydipsia, polyuria.     PHYSICAL EXAMINATION:  /72 (BP Location: Left arm, Patient Position: Sitting, BP Cuff Size: Adult)   Pulse 85   Temp 37 °C (98.6 °F) (Temporal)   Ht 1.676 m (5' 5.98\")   Wt 68 kg (149 lb 14.6 oz)   SpO2 96%   Body mass index is 24.21 kg/m².  Wt Readings from Last 4 Encounters:   04/15/19 68 kg (149 lb 14.6 oz)   12/31/18 74.4 kg (164 lb 0.4 oz)   11/12/18 75.6 kg (166 lb 10.7 oz)   06/15/18 75.1 kg (165 lb 9.1 oz)     General appearance:healthy, well developed, well nourished, well-groomed  Psych: alert, no distress, cooperative. Eye contact good, speech goal directed. Affect calm.   Eyes: conjunctivae and sclerae normal, lids and lashes normal, EOMI, PERRLA  ENT: Ears: external ears normal to inspection, canals clear. TMs pearly gray with normal light reflex and anatomic landmarks, Nose/Sinuses: nose shows no deformity, asymmetry, or inflammation, nasal mucosa normal, no sinus tenderness,   Oropharynx: lips normal without lesions, buccal mucosa normal, gums healthy, teeth intact, non-carious, palate normal, tongue midline and normal  Neck: no asymmetry, masses, adenopathy. Thyroid normal to palpation, carotids without bruits, no jugular venous distention  Lungs: clear to auscultation with good excursion, Normal respiratory rate. Chest symmetric no chest wall tenderness.  Cardiovascular: Regular rate and rhythm, no murmur.  No peripheral edema  Abdomen:  Soft, non-tender, no masses, HSM or palpable renal abnormalities. Bowel sounds normal, no bruits heard. No CVAT.  Musculoskeletal: no evidence of joint effusion, ROM of all joints is " normal, no crepitation detected, strength grossly normal UE and LE, proximal and distal motor groups. No deformities present  Lymphatic: None significantly enlarged supraclavicular, axillary, or inguinal  Skin: color normal, vascularity normal, no rashes or suspicious lesions, no evidence of bleeding or bruising  Neuro: speech normal, mental status intact, gait grossly normal, muscle tone normal.    LABS    Labs are reviewed and discussed with a patient    4/5/19:  1. Microalbumin: Negative  2. CMP: Within normal limits; FBG 95  3. A1c: 5.9  4. TSH: 0.63  5. CBC: Within normal limits      Lab Results   Component Value Date/Time    SODIUM 142 11/19/2018 06:41 AM    POTASSIUM 4.0 11/19/2018 06:41 AM    CHLORIDE 107 11/19/2018 06:41 AM    CO2 27 11/19/2018 06:41 AM    GLUCOSE 93 11/19/2018 06:41 AM    BUN 25 (H) 11/19/2018 06:41 AM    CREATININE 1.23 11/19/2018 06:41 AM    CREATININE 1.0 06/15/2008 04:05 AM     Lab Results   Component Value Date/Time    ALKPHOSPHAT 53 11/19/2018 06:41 AM    ASTSGOT 14 11/19/2018 06:41 AM    ALTSGPT 12 11/19/2018 06:41 AM    TBILIRUBIN 0.4 11/19/2018 06:41 AM      Lab Results   Component Value Date/Time    HBA1C 6.5 (H) 11/19/2018 06:41 AM    HBA1C 6.0 (H) 06/11/2018 08:34 AM     No results found for: TSH  Lab Results   Component Value Date/Time    FREET4 1.06 11/19/2018 06:41 AM     Lab Results   Component Value Date/Time    WBC 7.4 06/15/2008 04:05 AM    RBC 3.65 (L) 06/15/2008 04:05 AM    HEMOGLOBIN 11.6 (L) 06/15/2008 04:05 AM    HEMATOCRIT 34.2 (L) 06/15/2008 04:05 AM    MCV 93.8 06/15/2008 04:05 AM    MCH 31.8 06/15/2008 04:05 AM    MCHC 33.9 06/15/2008 04:05 AM    MPV 8.4 06/15/2008 04:05 AM    NEUTSPOLYS 78.4 (H) 06/14/2008 07:10 AM    LYMPHOCYTES 15.0 (L) 06/14/2008 07:10 AM    MONOCYTES 5.4 06/14/2008 07:10 AM    EOSINOPHILS 0.5 06/14/2008 07:10 AM    BASOPHILS 0.7 06/14/2008 07:10 AM      ASSESSMENT/PLAN    1. Annual physical exam  Reviewed PMH, PSH, FH, SH, ALL, MEDS,  HCM/IMM.   Advised healthy habits, diet, exercise.    2. Health care maintenance  Per HPI    3. Perimenopause  Explained natural course    4. Acquired hypothyroidism  Controlled, continue current treatment  - TSH; Future  - FREE THYROXINE; Future  - levothyroxine (SYNTHROID) 75 MCG Tab; Take 1 Tab by mouth Every morning on an empty stomach.  Dispense: 90 Tab; Refill: 1    5. IFG (impaired fasting glucose)  Improved, decrease Metformin to 500 mg twice daily  - Comp Metabolic Panel; Future  - HEMOGLOBIN A1C; Future  - metFORMIN (GLUCOPHAGE) 500 MG Tab; Take 1 Tab by mouth 2 times a day, with meals.  Dispense: 180 Tab; Refill: 1    6. MVP (mitral valve prolapse)  Asymptomatic, no medications    7. Polyp of colon, unspecified part of colon, unspecified type  Need colonoscopy after 5 years    Smoking Counseling: Nonsmoker    Next office visit is due in 6 months    All questions are answered.     Please note that this dictation was created using voice recognition software. Despite all efforts, some minor grammar mistakes are possible.

## 2019-07-29 ENCOUNTER — OFFICE VISIT (OUTPATIENT)
Dept: MEDICAL GROUP | Facility: MEDICAL CENTER | Age: 54
End: 2019-07-29
Payer: COMMERCIAL

## 2019-07-29 VITALS
WEIGHT: 145.28 LBS | SYSTOLIC BLOOD PRESSURE: 94 MMHG | OXYGEN SATURATION: 94 % | TEMPERATURE: 99.1 F | BODY MASS INDEX: 23.35 KG/M2 | HEIGHT: 66 IN | HEART RATE: 83 BPM | DIASTOLIC BLOOD PRESSURE: 66 MMHG

## 2019-07-29 DIAGNOSIS — L60.0 INGROWING TOENAIL: ICD-10-CM

## 2019-07-29 PROCEDURE — 99214 OFFICE O/P EST MOD 30 MIN: CPT | Performed by: INTERNAL MEDICINE

## 2019-07-29 NOTE — PROGRESS NOTES
CHIEF COMPLAINT  Chief Complaint   Patient presents with   • Other     in grown toe  left     HPI  Jacinda Pittman is a 54 y.o. female who presents today for the following     Ingrown toenail 3rd toe LT  The patient has had ingrown third left toenail.  She has history of elevated blood sugar.  She has not had fever, chills, redness, swelling.  Requested podiatry referral.    Reviewed PMH, PSH, FH, SH, ALL, HCM/IMM, no changes  Reviewed MEDS, no changes    Patient Active Problem List    Diagnosis Date Noted   • Anemia 12/31/2018   • Polyp of colon 12/31/2018   • Acquired hypothyroidism 11/12/2018   • Health care maintenance 11/12/2018   • Perimenopause 06/15/2018   • IFG (impaired fasting glucose) 03/12/2018   • MVP (mitral valve prolapse) 03/12/2018     CURRENT MEDICATIONS  No current outpatient prescriptions on file.     No current facility-administered medications for this visit.      ALLERGIES  Allergies: Patient has no known allergies.  PAST MEDICAL HISTORY  Past Medical History:   Diagnosis Date   • MVP (mitral valve prolapse) 3/12/2018   • Hypothyroid      SURGICAL HISTORY  She  has no past surgical history on file.  SOCIAL HISTORY  Social History   Substance Use Topics   • Smoking status: Never Smoker   • Smokeless tobacco: Never Used   • Alcohol use No     Social History     Social History Narrative   • No narrative on file     FAMILY HISTORY  Family History   Problem Relation Age of Onset   • No Known Problems Mother    • GI Father         liver   • Thyroid Neg Hx    • Diabetes Neg Hx      Family Status   Relation Status   • Mo Alive   • Fa Alive   • Neg Hx (Not Specified)     ROS   Constitutional: Negative for fever, chills.  Respiratory: Negative for shortness of breath.  Cardiovascular: Negative for chest pain, palpitations.   Gastrointestinal: Negative for heartburn, abdominal pain.   Musculoskeletal: Negative for toe pain.  Skin: Negative for rash.   Neuro: Negative for dizziness, headaches.  "  Endo/Heme/Allergies: Does not bruise/bleed easily.   Psychiatric/Behavioral: Negative for depression.    PHYSICAL EXAM   BP (!) 94/66   Pulse 83   Temp 37.3 °C (99.1 °F) (Temporal)   Ht 1.676 m (5' 6\")   Wt 65.9 kg (145 lb 4.5 oz)   SpO2 94%  Body mass index is 23.45 kg/m².  General:  NAD, well appearing  HEENT:   NC/AT, PERRLA, EOMI, TMs are clear. Oropharyngeal mucosa is pink,  without lesions;  no cervical / supraclavicular  lymphadenopathy, no thyromegaly.    Cardiovascular: RRR.   No m/r/g.       Lungs:   CTAB, no w/r/r, no respiratory distress.  Abdomen: Soft, NT/ND; no hepatosplenomegaly.  Extremities:  2+ DP and radial pulses bilaterally.  No c/c/e.  Ingrown third left toenail.  Skin:  Warm, dry.  No erythema. No rash.   Neurologic: Alert & oriented x 3. CN II-XII grossly intact. No focal deficits.  Psychiatric:  Affect normal, mood normal, judgment normal.    Labs     Labs are reviewed and discussed with a patient  No results found for: CHOLSTRLTOT, LDL, HDL, TRIGLYCERIDE    Lab Results   Component Value Date/Time    SODIUM 142 11/19/2018 06:41 AM    POTASSIUM 4.0 11/19/2018 06:41 AM    CHLORIDE 107 11/19/2018 06:41 AM    CO2 27 11/19/2018 06:41 AM    GLUCOSE 93 11/19/2018 06:41 AM    BUN 25 (H) 11/19/2018 06:41 AM    CREATININE 1.23 11/19/2018 06:41 AM    CREATININE 1.0 06/15/2008 04:05 AM     Lab Results   Component Value Date/Time    ALKPHOSPHAT 53 11/19/2018 06:41 AM    ASTSGOT 14 11/19/2018 06:41 AM    ALTSGPT 12 11/19/2018 06:41 AM    TBILIRUBIN 0.4 11/19/2018 06:41 AM      Lab Results   Component Value Date/Time    HBA1C 6.5 (H) 11/19/2018 06:41 AM    HBA1C 6.0 (H) 06/11/2018 08:34 AM     No results found for: TSH  Lab Results   Component Value Date/Time    FREET4 1.06 11/19/2018 06:41 AM     Lab Results   Component Value Date/Time    WBC 7.4 06/15/2008 04:05 AM    RBC 3.65 (L) 06/15/2008 04:05 AM    HEMOGLOBIN 11.6 (L) 06/15/2008 04:05 AM    HEMATOCRIT 34.2 (L) 06/15/2008 04:05 AM    MCV 93.8 " 06/15/2008 04:05 AM    MCH 31.8 06/15/2008 04:05 AM    MCHC 33.9 06/15/2008 04:05 AM    MPV 8.4 06/15/2008 04:05 AM    NEUTSPOLYS 78.4 (H) 06/14/2008 07:10 AM    LYMPHOCYTES 15.0 (L) 06/14/2008 07:10 AM    MONOCYTES 5.4 06/14/2008 07:10 AM    EOSINOPHILS 0.5 06/14/2008 07:10 AM    BASOPHILS 0.7 06/14/2008 07:10 AM      Imaging     None    Assessment and Plan     Jacinda Pittman is a 54 y.o. female    1. Ingrowing toenail  - REFERRAL TO PODIATRY    Counseling:   - Smoking:  Nonsmoker    Followup: Return in about 2 weeks (around 8/12/2019).    All questions are answered.    Please note that this dictation was created using voice recognition software, and that there might be errors of parth and possibly content.

## 2019-08-06 LAB — HBA1C MFR BLD: 5.8 % (ref 0–5.6)

## 2019-10-14 ENCOUNTER — OFFICE VISIT (OUTPATIENT)
Dept: MEDICAL GROUP | Facility: MEDICAL CENTER | Age: 54
End: 2019-10-14
Payer: COMMERCIAL

## 2019-10-14 VITALS
SYSTOLIC BLOOD PRESSURE: 96 MMHG | DIASTOLIC BLOOD PRESSURE: 64 MMHG | OXYGEN SATURATION: 96 % | HEIGHT: 66 IN | TEMPERATURE: 98.5 F | HEART RATE: 74 BPM | BODY MASS INDEX: 23.1 KG/M2 | WEIGHT: 143.74 LBS

## 2019-10-14 DIAGNOSIS — I34.1 MVP (MITRAL VALVE PROLAPSE): ICD-10-CM

## 2019-10-14 DIAGNOSIS — E03.9 ACQUIRED HYPOTHYROIDISM: ICD-10-CM

## 2019-10-14 DIAGNOSIS — N18.30 CKD (CHRONIC KIDNEY DISEASE), STAGE III (HCC): ICD-10-CM

## 2019-10-14 DIAGNOSIS — Z00.00 HEALTHCARE MAINTENANCE: ICD-10-CM

## 2019-10-14 DIAGNOSIS — Z12.39 SCREENING FOR MALIGNANT NEOPLASM OF BREAST: ICD-10-CM

## 2019-10-14 DIAGNOSIS — R73.01 IFG (IMPAIRED FASTING GLUCOSE): ICD-10-CM

## 2019-10-14 PROCEDURE — 99214 OFFICE O/P EST MOD 30 MIN: CPT | Performed by: INTERNAL MEDICINE

## 2019-10-14 RX ORDER — LEVOTHYROXINE SODIUM 0.07 MG/1
75 TABLET ORAL
Refills: 1 | COMMUNITY
Start: 2019-09-30 | End: 2020-12-21 | Stop reason: SDUPTHER

## 2019-10-15 NOTE — PROGRESS NOTES
CHIEF COMPLAINT  Chief Complaint   Patient presents with   • Follow-Up     6 month   Thyroid    HPI  Jacinda Pittman is a 54 y.o. female who presents today for the following     CKD stage III  The patient had abnormal GFR and creatinine in labs from 8/19, 2 months ago.  She used in the past ibuprofen.  Insufficient fluid intake.    IFG  Internal course:  -A1c 5.9, was 6.5.     On metformin 750 mg BID  No polydipsia, polyphagia, polyuria.  No abdominal pain, weight loss, fatigue.  Diet: regular  Exercise: limited  FH of DM: Neg     Hypothyroidism  Levothyroxine, 75 mcg, taking daily early in am, before any po intake.  No temperature intolerance. No change in weight, hair/skin quality, BMs.   No tremors, weakness.  No peripheral swelling.  No mood changes.  FH: N     MVP  The patient has a remote diagnosis of MVP.   She has not here palpitations, irregular heartbeats, chest pain or pressure  -Exertional shortness of breath and chest pain.   Echocardiography was remote.     Reviewed PMH, PSH, FH, SH, ALL, HCM/IMM, no changes  Reviewed MEDS, no changes    Patient Active Problem List    Diagnosis Date Noted   • CKD (chronic kidney disease), stage III (HCC) 10/14/2019   • Anemia 12/31/2018   • Polyp of colon 12/31/2018   • Acquired hypothyroidism 11/12/2018   • Health care maintenance 11/12/2018   • Perimenopause 06/15/2018   • IFG (impaired fasting glucose) 03/12/2018   • MVP (mitral valve prolapse) 03/12/2018     CURRENT MEDICATIONS  Current Outpatient Medications   Medication Sig Dispense Refill   • levothyroxine (SYNTHROID) 75 MCG Tab Take 75 mcg by mouth every morning before breakfast.  1     No current facility-administered medications for this visit.      ALLERGIES  Allergies: Patient has no known allergies.  PAST MEDICAL HISTORY  Past Medical History:   Diagnosis Date   • CKD (chronic kidney disease), stage III (HCC) 10/14/2019   • MVP (mitral valve prolapse) 3/12/2018   • Hypothyroid      SURGICAL  "HISTORY  She  has no past surgical history on file.  SOCIAL HISTORY  Social History     Tobacco Use   • Smoking status: Never Smoker   • Smokeless tobacco: Never Used   Substance Use Topics   • Alcohol use: No   • Drug use: No     Social History     Social History Narrative   • Not on file     FAMILY HISTORY  Family History   Problem Relation Age of Onset   • No Known Problems Mother    • GI Disease Father         liver   • Thyroid Neg Hx    • Diabetes Neg Hx      Family Status   Relation Name Status   • Mo  Alive   • Fa  Alive   • Neg Hx  (Not Specified)       ROS   Constitutional: Negative for fever, chills, fatigue.  HENT: Negative for congestion, sore throat.  Eyes: Negative for vision problems.   Respiratory: Negative for cough, shortness of breath.  Cardiovascular: Negative for chest pain, palpitations.   Gastrointestinal: Negative for heartburn, nausea, abdominal pain.   Genitourinary: Negative for dysuria.  Musculoskeletal: Negative for significant myalgia, back and joint pain.   Skin: Negative for rash.   Neuro: Negative for dizziness, weakness and headaches.   Endo/Heme/Allergies: Does not bruise/bleed easily.   Psychiatric/Behavioral: Negative for depression.    PHYSICAL EXAM   Blood Pressure (Abnormal) 96/64   Pulse 74   Temperature 36.9 °C (98.5 °F) (Temporal)   Height 1.676 m (5' 6\")   Weight 65.2 kg (143 lb 11.8 oz)   Oxygen Saturation 96%  Body mass index is 23.2 kg/m².  General:  NAD, well appearing  HEENT:   NC/AT, PERRLA, EOMI, TMs are clear. Oropharyngeal mucosa is pink,  without lesions;  no cervical / supraclavicular  lymphadenopathy, no thyromegaly.    Cardiovascular: RRR.   No m/r/g.       Lungs:   CTAB, no w/r/r, no respiratory distress.  Abdomen: Soft, NT/ND; no hepatosplenomegaly.  Extremities:  2+ DP and radial pulses bilaterally.  No c/c/e.   Skin:  Warm, dry.  No erythema. No rash.   Neurologic: Alert & oriented x 3. CN II-XII grossly intact. No focal deficits.  Psychiatric:  " Affect normal, mood normal, judgment normal.    Labs     Labs are reviewed and discussed with a patient    8/6/2019  · Fasting blood glucose 94  · CMP: Creatinine 1.17, GFR 53, otherwise within normal limits  · TSH 0.19, FT4 1.4    Imaging     None    Assessment and Plan     Jacinda Pittman is a 54 y.o. female    1. CKD (chronic kidney disease), stage III (HCC)  Advised good fluid intake to greater than 40 ounces a day, avoid NSAIDs  - Comp Metabolic Panel; Future    2. IFG (impaired fasting glucose)  Pending labs, advised low-calorie diet, daily exercise  - Comp Metabolic Panel; Future  - HEMOGLOBIN A1C; Future    3. Acquired hypothyroidism  TSH was slightly abnormal, FT4 within normal limits, pending labs, continue current treatment  - TSH; Future  - FREE THYROXINE; Future    4. MVP (mitral valve prolapse)  Asymptomatic    5. Screening for malignant neoplasm of breast  - MA-SCREEN MAMMO W/CAD-BILAT; Future    6. Healthcare maintenance  Declined flu shot    Counseling:   - Smoking:  Nonsmoker    Followup: Return in about 6 weeks (around 11/25/2019) for Labs.    All questions are answered.    Please note that this dictation was created using voice recognition software, and that there might be errors of parth and possibly content.

## 2019-10-28 DIAGNOSIS — E03.9 ACQUIRED HYPOTHYROIDISM: ICD-10-CM

## 2019-10-28 RX ORDER — LEVOTHYROXINE SODIUM 0.07 MG/1
75 TABLET ORAL
Qty: 30 TAB | Refills: 0 | Status: SHIPPED | OUTPATIENT
Start: 2019-10-28 | End: 2019-11-30 | Stop reason: SDUPTHER

## 2019-11-30 DIAGNOSIS — E03.9 ACQUIRED HYPOTHYROIDISM: ICD-10-CM

## 2019-12-02 ENCOUNTER — HOSPITAL ENCOUNTER (OUTPATIENT)
Dept: RADIOLOGY | Facility: MEDICAL CENTER | Age: 54
End: 2019-12-02
Attending: INTERNAL MEDICINE
Payer: COMMERCIAL

## 2019-12-02 DIAGNOSIS — Z12.39 SCREENING FOR MALIGNANT NEOPLASM OF BREAST: ICD-10-CM

## 2019-12-02 LAB — HBA1C MFR BLD: 5.7 % (ref 0–5.6)

## 2019-12-02 PROCEDURE — 77063 BREAST TOMOSYNTHESIS BI: CPT

## 2019-12-02 RX ORDER — LEVOTHYROXINE SODIUM 0.07 MG/1
75 TABLET ORAL
Qty: 30 TAB | Refills: 0 | Status: SHIPPED | OUTPATIENT
Start: 2019-12-02 | End: 2019-12-17

## 2019-12-09 ENCOUNTER — OFFICE VISIT (OUTPATIENT)
Dept: MEDICAL GROUP | Facility: MEDICAL CENTER | Age: 54
End: 2019-12-09
Payer: COMMERCIAL

## 2019-12-09 VITALS
WEIGHT: 141.54 LBS | HEART RATE: 88 BPM | DIASTOLIC BLOOD PRESSURE: 70 MMHG | HEIGHT: 66 IN | BODY MASS INDEX: 22.75 KG/M2 | OXYGEN SATURATION: 97 % | RESPIRATION RATE: 14 BRPM | SYSTOLIC BLOOD PRESSURE: 102 MMHG | TEMPERATURE: 98.2 F

## 2019-12-09 DIAGNOSIS — N18.30 CKD (CHRONIC KIDNEY DISEASE), STAGE III (HCC): ICD-10-CM

## 2019-12-09 DIAGNOSIS — Z00.00 HEALTH CARE MAINTENANCE: ICD-10-CM

## 2019-12-09 DIAGNOSIS — E03.9 ACQUIRED HYPOTHYROIDISM: ICD-10-CM

## 2019-12-09 DIAGNOSIS — K63.5 POLYP OF COLON, UNSPECIFIED PART OF COLON, UNSPECIFIED TYPE: ICD-10-CM

## 2019-12-09 DIAGNOSIS — E55.9 HYPOVITAMINOSIS D: ICD-10-CM

## 2019-12-09 DIAGNOSIS — R73.01 IFG (IMPAIRED FASTING GLUCOSE): ICD-10-CM

## 2019-12-09 DIAGNOSIS — D64.9 ANEMIA, UNSPECIFIED TYPE: ICD-10-CM

## 2019-12-09 PROCEDURE — 99214 OFFICE O/P EST MOD 30 MIN: CPT | Performed by: INTERNAL MEDICINE

## 2019-12-09 NOTE — PROGRESS NOTES
CHIEF COMPLAINT  Hypothyroid, labs    HPI  Jacinda Pittman is a 54 y.o. female who presents today for the following     CKD stage III  The patient had abnormal GFR and creatinine in labs from 8/19, 2 months ago.  She used in the past ibuprofen.  Insufficient fluid intake.  Improved.     IFG  Last A1c was 5.7.   On metformin 750 mg BID  No polydipsia, polyphagia, polyuria.  No abdominal pain, weight loss, fatigue.  Diet: regular  Exercise: limited  FH of DM: Neg     Hypothyroidism  Levothyroxine, 75 mcg, taking daily early in am, before any po intake.  No temperature intolerance. No change in weight, hair/skin quality, BMs.   No tremors, weakness.  No peripheral swelling.  No mood changes.  FH: N     Hypovitaminosis D  The patient had low vitamin D level.  Vitamin D supplement: OTC.    Anemia, colon polyp  The patient had a abnormal CBC.  On regular diet.  She was found to have colon polyps on colonoscopy in July 2018, requested follow-up after 5 years.    Reviewed PMH, PSH, FH, SH, ALL, HCM/IMM, no changes  Reviewed MEDS, no changes    Patient Active Problem List    Diagnosis Date Noted   • CKD (chronic kidney disease), stage III (HCC) 10/14/2019   • Anemia 12/31/2018   • Polyp of colon 12/31/2018   • Acquired hypothyroidism 11/12/2018   • Health care maintenance 11/12/2018   • Perimenopause 06/15/2018   • IFG (impaired fasting glucose) 03/12/2018   • MVP (mitral valve prolapse) 03/12/2018     CURRENT MEDICATIONS  Current Outpatient Medications   Medication Sig Dispense Refill   • levothyroxine (SYNTHROID) 75 MCG Tab TAKE 1 TAB BY MOUTH EVERY MORNING ON AN EMPTY STOMACH. 30 Tab 0   • levothyroxine (SYNTHROID) 75 MCG Tab Take 75 mcg by mouth every morning before breakfast.  1     No current facility-administered medications for this visit.      ALLERGIES  Allergies: Patient has no known allergies.  PAST MEDICAL HISTORY  Past Medical History:   Diagnosis Date   • CKD (chronic kidney disease), stage III (HCC)  "10/14/2019   • MVP (mitral valve prolapse) 3/12/2018   • Hypothyroid      SURGICAL HISTORY  She  has no past surgical history on file.  SOCIAL HISTORY  Social History     Tobacco Use   • Smoking status: Never Smoker   • Smokeless tobacco: Never Used   Substance Use Topics   • Alcohol use: No   • Drug use: No     Social History     Patient does not qualify to have social determinant information on file (likely too young).   Social History Narrative   • Not on file     FAMILY HISTORY  Family History   Problem Relation Age of Onset   • No Known Problems Mother    • GI Disease Father         liver   • Thyroid Neg Hx    • Diabetes Neg Hx      Family Status   Relation Name Status   • Mo  Alive   • Fa  Alive   • Neg Hx  (Not Specified)     ROS   Constitutional: Negative for fever, chills, fatigue.  HENT: Negative for congestion, sore throat.  Eyes: Negative for vision problems.   Respiratory: Negative for cough, shortness of breath.  Cardiovascular: Negative for chest pain, palpitations.   Gastrointestinal: Negative for heartburn, nausea, abdominal pain.   Genitourinary: Negative for dysuria.  Musculoskeletal: Negative for significant myalgia, back and joint pain.   Skin: Negative for rash.   Neuro: Negative for dizziness, weakness and headaches.   Endo/Heme/Allergies: Does not bruise/bleed easily.   Psychiatric/Behavioral: Negative for depression.    PHYSICAL EXAM   Blood Pressure 102/70 (BP Location: Right arm, Patient Position: Sitting, BP Cuff Size: Adult)   Pulse 88   Temperature 36.8 °C (98.2 °F)   Respiration 14   Height 1.676 m (5' 5.98\")   Weight 64.2 kg (141 lb 8.6 oz)   Oxygen Saturation 97%  Body mass index is 22.86 kg/m².  General:  NAD, well appearing  HEENT:   NC/AT, PERRLA, EOMI, TMs are clear. Oropharyngeal mucosa is pink,  without lesions;  no cervical / supraclavicular  lymphadenopathy, no thyromegaly.    Cardiovascular: RRR.   No m/r/g.       Lungs:   CTAB, no w/r/r, no respiratory " distress.  Abdomen: Soft, NT/ND; no hepatosplenomegaly.  Extremities:  2+ DP and radial pulses bilaterally.  No c/c/e.   Skin:  Warm, dry.  No erythema. No rash.   Neurologic: Alert & oriented x 3. CN II-XII grossly intact. No focal deficits.  Psychiatric:  Affect normal, mood normal, judgment normal.    Labs     Labs are reviewed and discussed with a patient    12/2/2019  · CMP: Within normal limits  · A1c 5.7  · TSH 0.22, FT4 1.0    No results found for: CHOLSTRLTOT, LDL, HDL, TRIGLYCERIDE    Lab Results   Component Value Date/Time    SODIUM 142 11/19/2018 06:41 AM    POTASSIUM 4.0 11/19/2018 06:41 AM    CHLORIDE 107 11/19/2018 06:41 AM    CO2 27 11/19/2018 06:41 AM    GLUCOSE 93 11/19/2018 06:41 AM    BUN 25 (H) 11/19/2018 06:41 AM    CREATININE 1.23 11/19/2018 06:41 AM    CREATININE 1.0 06/15/2008 04:05 AM     Lab Results   Component Value Date/Time    ALKPHOSPHAT 53 11/19/2018 06:41 AM    ASTSGOT 14 11/19/2018 06:41 AM    ALTSGPT 12 11/19/2018 06:41 AM    TBILIRUBIN 0.4 11/19/2018 06:41 AM      Lab Results   Component Value Date/Time    HBA1C 5.8 (A) 08/06/2019    HBA1C 6.5 (H) 11/19/2018 06:41 AM    HBA1C 6.0 (H) 06/11/2018 08:34 AM     No results found for: TSH  Lab Results   Component Value Date/Time    FREET4 1.06 11/19/2018 06:41 AM       Lab Results   Component Value Date/Time    WBC 7.4 06/15/2008 04:05 AM    RBC 3.65 (L) 06/15/2008 04:05 AM    HEMOGLOBIN 11.6 (L) 06/15/2008 04:05 AM    HEMATOCRIT 34.2 (L) 06/15/2008 04:05 AM    MCV 93.8 06/15/2008 04:05 AM    MCH 31.8 06/15/2008 04:05 AM    MCHC 33.9 06/15/2008 04:05 AM    MPV 8.4 06/15/2008 04:05 AM    NEUTSPOLYS 78.4 (H) 06/14/2008 07:10 AM    LYMPHOCYTES 15.0 (L) 06/14/2008 07:10 AM    MONOCYTES 5.4 06/14/2008 07:10 AM    EOSINOPHILS 0.5 06/14/2008 07:10 AM    BASOPHILS 0.7 06/14/2008 07:10 AM      Imaging     None    Assessment and Plan     Jacinda Pittman is a 54 y.o. female    1. CKD (chronic kidney disease), stage III (HCC)  Improved,  advised to continue good fluid intake, avoid NSAIDs    2. IFG (impaired fasting glucose)  Improved, continue current treatment  - Comp Metabolic Panel; Future  - HEMOGLOBIN A1C; Future    3. Acquired hypothyroidism  TSH was suppressed, advised to decrease levothyroxine to 1/2 tablets for 2 days in a week and 1 tablet for remaining 5 days in a week  - TSH; Future  - FREE THYROXINE; Future    4. Hypovitaminosis D  Advised 2000 units of vitamin D daily, OTC  - VITAMIN D,25 HYDROXY; Future    5. Anemia, unspecified type  Follow-up labs  - CBC WITH DIFFERENTIAL; Future  - IRON/TOTAL IRON BIND; Future  - VIT B12,  FOLIC ACID    6. Polyp of colon, unspecified part of colon, unspecified type  Colonoscopy after 5 years, in 2023    7. Health care maintenance  Declined flu shot    Counseling:   - Smoking:  Nonsmoker*    Followup: Return in about 3 months (around 3/9/2020).    All questions are answered.    Please note that this dictation was created using voice recognition software, and that there might be errors of parth and possibly content.

## 2019-12-16 DIAGNOSIS — R73.01 IFG (IMPAIRED FASTING GLUCOSE): ICD-10-CM

## 2019-12-16 DIAGNOSIS — E03.9 ACQUIRED HYPOTHYROIDISM: ICD-10-CM

## 2019-12-16 DIAGNOSIS — N18.30 CKD (CHRONIC KIDNEY DISEASE), STAGE III: ICD-10-CM

## 2019-12-17 DIAGNOSIS — E03.9 ACQUIRED HYPOTHYROIDISM: ICD-10-CM

## 2019-12-17 RX ORDER — LEVOTHYROXINE SODIUM 0.07 MG/1
75 TABLET ORAL
Qty: 60 TAB | Refills: 2 | Status: SHIPPED | OUTPATIENT
Start: 2019-12-17 | End: 2020-03-02 | Stop reason: SDUPTHER

## 2020-02-25 NOTE — PROGRESS NOTES
CHIEF COMPLIANT:  Jacinda Pittman is a 54 y.o. female who presents for annual exam    Pt has GYN provider: no    Recommendations:  Regular exercise at least 4 days a week  Diet: advised balanced diet  Dental exam at least 1-2 times per year  Sunscreen use: advised    Immunizations:  TdaP:  2015     Colonoscopy: Given order  Bone density test:      GYN  Previous PAP:  2018, normal   Abnormal PAP: no  Last Mammography: 4/2018     Menarche/menopause      Perimenopause, last.  It was 2 months ago, medium bleeding, no excessive cramping.  Denies hot flushes, sweating, vaginal dryness, mood swings.      IFG  Internal course:  - The patient has have elevated FBG/A1c, improved  No polydipsia, polyphagia, polyuria.  No abdominal pain, weight loss, fatigue.  Diet: regular  Exercise: limited  No medications.  FH of DM: Neg    CKD stage III  The patient had GFR 55, with normal creatinine and electrolytes.   No consistent NSAIDs use.  Low fluid intake.    Hypothyroidism  Levothyroxine, 75 mcg, taking daily early in am, before any po intake.  No temperature intolerance. No change in weight, hair/skin quality, BMs.   No tremors, weakness.  No peripheral swelling.  No mood changes.  FH: N     MVP  The patient has a remote diagnosis of MVP.   She has not here palpitations, irregular heartbeats, chest pain or pressure              -Exertional shortness of breath and chest pain.   Echocardiography was remote.     Hypovitaminosis D  The patient had low vitamin D level.  Vitamin D supplement: OTC.     Anemia, colon polyp  The patient had a abnormal CBC.  On regular diet.  She was found to have colon polyps on colonoscopy in July 2018, requested follow-up after 5 years.    CURRENT MEDICATIONS  Current Outpatient Medications   Medication Sig Dispense Refill   • levothyroxine (SYNTHROID) 75 MCG Tab TAKE 1 TAB BY MOUTH EVERY MORNING ON AN EMPTY STOMACH. 60 Tab 2   • levothyroxine (SYNTHROID) 75 MCG Tab Take 75 mcg by mouth every  morning before breakfast.  1     No current facility-administered medications for this visit.      ALLERGIES  Allergies: Patient has no known allergies.  PAST MEDICAL HISTORY  She  has a past medical history of CKD (chronic kidney disease), stage III (HCC) (10/14/2019), Hypothyroid, and MVP (mitral valve prolapse) (3/12/2018). She also has no past medical history of Angina, Arrhythmia, Arthritis, ASTHMA, Backpain, Bronchitis, CAD (coronary artery disease), Cancer (HCC), CATARACT, Congestive heart failure (HCC), COPD, Diabetes, Dialysis, Glaucoma, Hypertension, Indigestion, Infectious disease, Jaundice, Liver disease, Myocardial infarct (HCC), Other specified symptom associated with female genital organs, Pacemaker, Personal history of venous thrombosis and embolism, Pneumonia, Psychiatric disorder, Psychiatric problem, Rheumatic fever, Seizure (HCC), Seizure disorder (HCC), Stroke (HCC), Unspecified disorder of thyroid, Unspecified hemorrhagic conditions, or Unspecified urinary incontinence.  SURGICAL HISTORY  She  has no past surgical history on file.  SOCIAL HISTORY  Social History     Tobacco Use   • Smoking status: Never Smoker   • Smokeless tobacco: Never Used   Substance Use Topics   • Alcohol use: No   • Drug use: No     Social History     Social History Narrative   • Not on file     FAMILY HISTORY  Family History   Problem Relation Age of Onset   • No Known Problems Mother    • GI Disease Father         liver   • Thyroid Neg Hx    • Diabetes Neg Hx      Family Status   Relation Name Status   • Mo  Alive   • Fa  Alive   • Neg Hx  (Not Specified)     REVIEW OF SYSTEMS  Constitutional: Denies fever, chills, fatigue.  Skin: negative for rash, scaling, itching, pigmentation, hair or nail changes.  Eyes: negative for blurred vision, eye pain, discharge from eyes.  ENT: negative for hearing problems, tinnitus, vertigo.  Respiratory: negative for persistent cough, dyspnea, wheezing, SOB.  Cardiovascular: negative  "for palpitations, tachycardia, irregular heart beats, chest pain, or peripheral edema.  Gastrointestinal: negative for anorexia, dysphagia, nausea, heartburn/reflux, abdominal pain, constipation or diarrhea.  Genitourinary: negative for dysuria, frequency, incontinence.  Musculoskeletal: negative for back/joint pain, myalgia.   Neuro: negative for headaches, involuntary movements or tremor.  Psychiatric: negative for excessive alcohol use, sleep problems, anxiety, depression.  Hematologic/Lymphatic/Immunologic: negative for anemia, unusual bruising, swollen glands.  Endocrine: negative for temperature intolerance, polydipsia, polyuria.     PHYSICAL EXAMINATION:  Blood Pressure (Abnormal) 98/60 (BP Location: Left arm, Patient Position: Standing, BP Cuff Size: Adult)   Pulse 85   Temperature 36.7 °C (98.1 °F) (Temporal)   Height 1.676 m (5' 6\")   Weight 64.8 kg (142 lb 13.7 oz)   Oxygen Saturation 97%   Body Mass Index 23.06 kg/m²   Wt Readings from Last 4 Encounters:   12/09/19 64.2 kg (141 lb 8.6 oz)   10/14/19 65.2 kg (143 lb 11.8 oz)   07/29/19 65.9 kg (145 lb 4.5 oz)   04/15/19 68 kg (149 lb 14.6 oz)   General appearance:healthy, well developed, well nourished, well-groomed  Psych: alert, no distress, cooperative. Eye contact good, speech goal directed. Affect calm.   Eyes: conjunctivae and sclerae normal, lids and lashes normal, EOMI, PERRLA  ENT: Ears: external ears normal to inspection, canals clear. TMs pearly gray with normal light reflex and anatomic landmarks, Nose/Sinuses: nose shows no deformity, asymmetry, or inflammation, nasal mucosa normal, no sinus tenderness,   Oropharynx: lips normal without lesions, buccal mucosa normal, gums healthy, teeth intact, non-carious, palate normal, tongue midline and normal  Neck: no asymmetry, masses, adenopathy. Thyroid normal to palpation, carotids without bruits, no jugular venous distention  Lungs: clear to auscultation with good excursion, Normal " respiratory rate. Chest symmetric no chest wall tenderness.  Cardiovascular: Regular rate and rhythm, no murmur.  No peripheral edema  Abdomen:  Soft, non-tender, no masses, HSM or palpable renal abnormalities. Bowel sounds normal, no bruits heard. No CVAT.  Musculoskeletal: no evidence of joint effusion, ROM of all joints is normal, no crepitation detected, strength grossly normal UE and LE, proximal and distal motor groups. No deformities present  Lymphatic: None significantly enlarged supraclavicular, axillary, or inguinal  Skin: color normal, vascularity normal, no rashes or suspicious lesions, no evidence of bleeding or bruising  Neuro: speech normal, mental status intact, gait grossly normal, muscle tone normal.    LABS    Labs are reviewed and discussed with a patient  No results found for: CHOLSTRLTOT, LDL, HDL, TRIGLYCERIDE    Lab Results   Component Value Date/Time    SODIUM 142 11/19/2018 06:41 AM    POTASSIUM 4.0 11/19/2018 06:41 AM    CHLORIDE 107 11/19/2018 06:41 AM    CO2 27 11/19/2018 06:41 AM    GLUCOSE 93 11/19/2018 06:41 AM    BUN 25 (H) 11/19/2018 06:41 AM    CREATININE 1.23 11/19/2018 06:41 AM    CREATININE 1.0 06/15/2008 04:05 AM     Lab Results   Component Value Date/Time    ALKPHOSPHAT 53 11/19/2018 06:41 AM    ASTSGOT 14 11/19/2018 06:41 AM    ALTSGPT 12 11/19/2018 06:41 AM    TBILIRUBIN 0.4 11/19/2018 06:41 AM      Lab Results   Component Value Date/Time    HBA1C 5.7 (A) 12/02/2019    HBA1C 5.8 (A) 08/06/2019    HBA1C 6.5 (H) 11/19/2018 06:41 AM     No results found for: TSH  Lab Results   Component Value Date/Time    FREET4 1.06 11/19/2018 06:41 AM       Lab Results   Component Value Date/Time    WBC 7.4 06/15/2008 04:05 AM    RBC 3.65 (L) 06/15/2008 04:05 AM    HEMOGLOBIN 11.6 (L) 06/15/2008 04:05 AM    HEMATOCRIT 34.2 (L) 06/15/2008 04:05 AM    MCV 93.8 06/15/2008 04:05 AM    MCH 31.8 06/15/2008 04:05 AM    MCHC 33.9 06/15/2008 04:05 AM    MPV 8.4 06/15/2008 04:05 AM    NEUTSPOLYS 78.4  (H) 06/14/2008 07:10 AM    LYMPHOCYTES 15.0 (L) 06/14/2008 07:10 AM    MONOCYTES 5.4 06/14/2008 07:10 AM    EOSINOPHILS 0.5 06/14/2008 07:10 AM    BASOPHILS 0.7 06/14/2008 07:10 AM      ASSESSMENT/PLAN:    1. Annual physical exam  Reviewed PMH, PSH, FH, SH, ALL, MEDS, HCM/IMM.   Advised healthy habits, diet, exercise.    2. Health care maintenance  Per [facility]    3. IFG (impaired fasting glucose)  Discussed about risk to develop DM.   Advised low carb diet, exercise, watch for WT.   - Comp Metabolic Panel; Future    4. CKD (chronic kidney disease), stage III (HCC)  Advised   - Comp Metabolic Panel; Future    5. Acquired hypothyroidism  Pending labs, continue current treatment  - TSH; Future  - FREE THYROXINE; Future  - levothyroxine (SYNTHROID) 75 MCG Tab; Take 1 Tab by mouth Every morning on an empty stomach.  Dispense: 90 Tab; Refill: 0    6. MVP (mitral valve prolapse)  asymptomatic    7. Hypovitaminosis D  Pending labs, advised 2000 units vitamin D daily, OTC  - VITAMIN D,25 HYDROXY; Future    8. Anemia, unspecified type  Pending labs  - CBC WITH DIFFERENTIAL; Future  - VIT B12,  FOLIC ACID  - IRON/TOTAL IRON BIND; Future    9. Polyp of colon, unspecified part of colon, unspecified type   Continue colonoscopy follow-up per GI recommendation    Smoking Counseling: Nonsmoker    Next office visit is due in 2 months    All questions are answered.     Please note that this dictation was created using voice recognition software. Despite all efforts, some minor grammar mistakes are possible.

## 2020-03-02 ENCOUNTER — OFFICE VISIT (OUTPATIENT)
Dept: MEDICAL GROUP | Facility: MEDICAL CENTER | Age: 55
End: 2020-03-02
Payer: COMMERCIAL

## 2020-03-02 VITALS
WEIGHT: 142.86 LBS | OXYGEN SATURATION: 97 % | TEMPERATURE: 98.1 F | DIASTOLIC BLOOD PRESSURE: 60 MMHG | SYSTOLIC BLOOD PRESSURE: 98 MMHG | HEIGHT: 66 IN | BODY MASS INDEX: 22.96 KG/M2 | HEART RATE: 85 BPM

## 2020-03-02 DIAGNOSIS — D64.9 ANEMIA, UNSPECIFIED TYPE: ICD-10-CM

## 2020-03-02 DIAGNOSIS — R73.01 IFG (IMPAIRED FASTING GLUCOSE): ICD-10-CM

## 2020-03-02 DIAGNOSIS — I34.1 MVP (MITRAL VALVE PROLAPSE): ICD-10-CM

## 2020-03-02 DIAGNOSIS — E03.9 ACQUIRED HYPOTHYROIDISM: ICD-10-CM

## 2020-03-02 DIAGNOSIS — K63.5 POLYP OF COLON, UNSPECIFIED PART OF COLON, UNSPECIFIED TYPE: ICD-10-CM

## 2020-03-02 DIAGNOSIS — N18.30 CKD (CHRONIC KIDNEY DISEASE), STAGE III: ICD-10-CM

## 2020-03-02 DIAGNOSIS — E55.9 HYPOVITAMINOSIS D: ICD-10-CM

## 2020-03-02 DIAGNOSIS — Z00.00 HEALTH CARE MAINTENANCE: ICD-10-CM

## 2020-03-02 DIAGNOSIS — Z00.00 ANNUAL PHYSICAL EXAM: ICD-10-CM

## 2020-03-02 PROCEDURE — 99396 PREV VISIT EST AGE 40-64: CPT | Performed by: INTERNAL MEDICINE

## 2020-03-02 RX ORDER — LEVOTHYROXINE SODIUM 0.07 MG/1
75 TABLET ORAL
Qty: 90 TAB | Refills: 0 | Status: SHIPPED | OUTPATIENT
Start: 2020-03-02

## 2020-03-02 ASSESSMENT — PATIENT HEALTH QUESTIONNAIRE - PHQ9: CLINICAL INTERPRETATION OF PHQ2 SCORE: 0

## 2020-03-19 ENCOUNTER — OFFICE VISIT (OUTPATIENT)
Dept: MEDICAL GROUP | Age: 55
End: 2020-03-19
Payer: COMMERCIAL

## 2020-03-19 VITALS
TEMPERATURE: 98.5 F | HEIGHT: 66 IN | HEART RATE: 73 BPM | OXYGEN SATURATION: 98 % | BODY MASS INDEX: 23.53 KG/M2 | WEIGHT: 146.4 LBS | SYSTOLIC BLOOD PRESSURE: 110 MMHG | DIASTOLIC BLOOD PRESSURE: 62 MMHG

## 2020-03-19 DIAGNOSIS — T14.8XXA SKIN TRAUMA: ICD-10-CM

## 2020-03-19 DIAGNOSIS — S61.011D: ICD-10-CM

## 2020-03-19 PROCEDURE — 99214 OFFICE O/P EST MOD 30 MIN: CPT | Performed by: INTERNAL MEDICINE

## 2020-03-19 RX ORDER — CLINDAMYCIN HYDROCHLORIDE 300 MG/1
300 CAPSULE ORAL 3 TIMES DAILY
Qty: 30 CAP | Refills: 0 | Status: SHIPPED | OUTPATIENT
Start: 2020-03-19

## 2020-03-19 NOTE — PROGRESS NOTES
CHIEF COMPLAINT  Thum injury, RT    HPI  Jacinda Pittman is a 54 y.o. female who presents today for the following     Rt thumb laseration, trauma  New problem.    Incident: 2 days, with knife at work, with skin cut of the RT thumb, ~ 1 inch.  She was seen in [facility] the same day;  - they did wound care, gave TDAP.    C/o pain, slight, gradually worsening redness and swelling. No drainage, fever, chills.    Reviewed PMH, PSH, FH, SH, ALL, HCM/IMM, no changes  Reviewed MEDS, no changes    Patient Active Problem List    Diagnosis Date Noted   • Hypovitaminosis D 03/02/2020   • CKD (chronic kidney disease), stage III (HCC) 10/14/2019   • Anemia 12/31/2018   • Polyp of colon 12/31/2018   • Acquired hypothyroidism 11/12/2018   • Health care maintenance 11/12/2018   • Perimenopause 06/15/2018   • IFG (impaired fasting glucose) 03/12/2018   • MVP (mitral valve prolapse) 03/12/2018     CURRENT MEDICATIONS  Current Outpatient Medications   Medication Sig Dispense Refill   • clindamycin (CLEOCIN) 300 MG Cap Take 1 Cap by mouth 3 times a day. 30 Cap 0   • mupirocin (BACTROBAN) 2 % Ointment Apply 1 Application to affected area(s) every day. 1 Tube 0   • levothyroxine (SYNTHROID) 75 MCG Tab Take 1 Tab by mouth Every morning on an empty stomach. 90 Tab 0   • levothyroxine (SYNTHROID) 75 MCG Tab Take 75 mcg by mouth every morning before breakfast.  1     No current facility-administered medications for this visit.      ALLERGIES  Allergies: Patient has no known allergies.  PAST MEDICAL HISTORY  Past Medical History:   Diagnosis Date   • CKD (chronic kidney disease), stage III (HCC) 10/14/2019   • MVP (mitral valve prolapse) 3/12/2018   • Hypothyroid      SURGICAL HISTORY  She  has no past surgical history on file.  SOCIAL HISTORY  Social History     Tobacco Use   • Smoking status: Never Smoker   • Smokeless tobacco: Never Used   Substance Use Topics   • Alcohol use: No   • Drug use: No     Social History     Social  "History Narrative   • Not on file     FAMILY HISTORY  Family History   Problem Relation Age of Onset   • No Known Problems Mother    • GI Disease Father         liver   • Thyroid Neg Hx    • Diabetes Neg Hx      Family Status   Relation Name Status   • Mo  Alive   • Fa  Alive   • Neg Hx  (Not Specified)     ROS   Constitutional: Negative for fever, chills.  Respiratory: Negative for cough, shortness of breath.  Gastrointestinal: Negative for heartburn.   Musculoskeletal: as above.  Skin: Negative for rash.   Neuro: Negative for dizziness, weakness and headaches.   Endo/Heme/Allergies: Does not bruise/bleed easily.   Psychiatric/Behavioral: Negative for depression.    PHYSICAL EXAM   Blood Pressure 110/62 (BP Location: Right arm, Patient Position: Sitting, BP Cuff Size: Adult)   Pulse 73   Temperature 36.9 °C (98.5 °F) (Temporal)   Height 1.676 m (5' 6\")   Weight 66.4 kg (146 lb 6.4 oz)   Oxygen Saturation 98%  Body mass index is 23.63 kg/m².  General:  NAD, well appearing  HEENT:   NC/AT, PERRLA, EOMI, TMs are clear. Oropharyngeal mucosa is pink,  without lesions;  no cervical / supraclavicular  lymphadenopathy, no thyromegaly.    Cardiovascular: RRR.   No m/r/g.       Lungs:   CTAB, no w/r/r, no respiratory distress.  Abdomen: Soft, NT/ND; no hepatosplenomegaly.  Extremities:  2+ DP and radial pulses bilaterally.  No c/c/e.   Skin:  Warm, dry.  No erythema. No rash.   Neurologic: Alert & oriented x 3. CN II-XII grossly intact. No focal deficits.  Psychiatric:  Affect normal, mood normal, judgment normal.    Labs     Labs are reviewed and discussed with a patient  No results found for: CHOLSTRLTOT, LDL, HDL, TRIGLYCERIDE    Lab Results   Component Value Date/Time    SODIUM 142 11/19/2018 06:41 AM    POTASSIUM 4.0 11/19/2018 06:41 AM    CHLORIDE 107 11/19/2018 06:41 AM    CO2 27 11/19/2018 06:41 AM    GLUCOSE 93 11/19/2018 06:41 AM    BUN 25 (H) 11/19/2018 06:41 AM    CREATININE 1.23 11/19/2018 06:41 AM    " CREATININE 1.0 06/15/2008 04:05 AM     Lab Results   Component Value Date/Time    ALKPHOSPHAT 53 11/19/2018 06:41 AM    ASTSGOT 14 11/19/2018 06:41 AM    ALTSGPT 12 11/19/2018 06:41 AM    TBILIRUBIN 0.4 11/19/2018 06:41 AM      Lab Results   Component Value Date/Time    HBA1C 5.7 (A) 12/02/2019    HBA1C 5.8 (A) 08/06/2019    HBA1C 6.5 (H) 11/19/2018 06:41 AM     No results found for: TSH  Lab Results   Component Value Date/Time    FREET4 1.06 11/19/2018 06:41 AM     Lab Results   Component Value Date/Time    WBC 7.4 06/15/2008 04:05 AM    RBC 3.65 (L) 06/15/2008 04:05 AM    HEMOGLOBIN 11.6 (L) 06/15/2008 04:05 AM    HEMATOCRIT 34.2 (L) 06/15/2008 04:05 AM    MCV 93.8 06/15/2008 04:05 AM    MCH 31.8 06/15/2008 04:05 AM    MCHC 33.9 06/15/2008 04:05 AM    MPV 8.4 06/15/2008 04:05 AM    NEUTSPOLYS 78.4 (H) 06/14/2008 07:10 AM    LYMPHOCYTES 15.0 (L) 06/14/2008 07:10 AM    MONOCYTES 5.4 06/14/2008 07:10 AM    EOSINOPHILS 0.5 06/14/2008 07:10 AM    BASOPHILS 0.7 06/14/2008 07:10 AM      Imaging     None    Assessment and Plan     Jacinda Pittman is a 54 y.o. female    1. Skin trauma  Wound care with hydrogen peroxide, antibiotic ointment, dressing.  - clindamycin (CLEOCIN) 300 MG Cap; Take 1 Cap by mouth 3 times a day.  Dispense: 30 Cap; Refill: 0  - mupirocin (BACTROBAN) 2 % Ointment; Apply 1 Application to affected area(s) every day.  Dispense: 1 Tube; Refill: 0  2. Laceration of skin of right thumb, subsequent encounter  - clindamycin (CLEOCIN) 300 MG Cap; Take 1 Cap by mouth 3 times a day.  Dispense: 30 Cap; Refill: 0  - mupirocin (BACTROBAN) 2 % Ointment; Apply 1 Application to affected area(s) every day.  Dispense: 1 Tube; Refill: 0    Counseling:   - Smoking:  Nonsmoker    Followup: As needed    All questions are answered.    Please note that this dictation was created using voice recognition software, and that there might be errors of parth and possibly content.

## 2020-04-26 NOTE — PROGRESS NOTES
CHIEF COMPLAINT  Hypothyroid, labs    HPI  Jacinda Pittman is a 55 y.o. female who presents today for the following     IFG  Internal course:  - The patient has have elevated FBG/A1c, improved.  No polydipsia, polyphagia, polyuria.  No abdominal pain, weight loss, fatigue.  Diet: regular  Exercise: limited  BMI: 23  No medications.  FH of DM: Neg     CKD stage III  The last renal panel showed slightly abnormal GFR and creatinine and normal electrolytes.   No consistent NSAIDs use.  Fluid intake has been low.     Hypothyroidism  Levothyroxine, 75 mcg, taking daily early in am, before any po intake.  No temperature intolerance. No change in weight, hair/skin quality, BMs.   No tremors, weakness.  No peripheral swelling.  No mood changes.  FH: N     MVP  The patient has a remote diagnosis of MVP.   She has not here palpitations, irregular heartbeats, chest pain or pressure              -Exertional shortness of breath and chest pain.   Echocardiography was remote.     Hypovitaminosis D  The patient had low vitamin D level at 14.  Vitamin D supplement: no.    Reviewed PMH, PSH, FH, SH, ALL, HCM/IMM, no changes  Reviewed MEDS, no changes    Patient Active Problem List    Diagnosis Date Noted   • Hypovitaminosis D 03/02/2020   • CKD (chronic kidney disease), stage III (HCC) 10/14/2019   • Anemia 12/31/2018   • Polyp of colon 12/31/2018   • Acquired hypothyroidism 11/12/2018   • Health care maintenance 11/12/2018   • Perimenopause 06/15/2018   • IFG (impaired fasting glucose) 03/12/2018   • MVP (mitral valve prolapse) 03/12/2018     CURRENT MEDICATIONS  Current Outpatient Medications   Medication Sig Dispense Refill   • clindamycin (CLEOCIN) 300 MG Cap Take 1 Cap by mouth 3 times a day. 30 Cap 0   • mupirocin (BACTROBAN) 2 % Ointment Apply 1 Application to affected area(s) every day. 1 Tube 0   • levothyroxine (SYNTHROID) 75 MCG Tab Take 1 Tab by mouth Every morning on an empty stomach. 90 Tab 0   • levothyroxine  "(SYNTHROID) 75 MCG Tab Take 75 mcg by mouth every morning before breakfast.  1     No current facility-administered medications for this visit.      ALLERGIES  Allergies: Patient has no known allergies.  PAST MEDICAL HISTORY  Past Medical History:   Diagnosis Date   • CKD (chronic kidney disease), stage III (HCC) 10/14/2019   • MVP (mitral valve prolapse) 3/12/2018   • Hypothyroid      SURGICAL HISTORY  She  has no past surgical history on file.  SOCIAL HISTORY  Social History     Tobacco Use   • Smoking status: Never Smoker   • Smokeless tobacco: Never Used   Substance Use Topics   • Alcohol use: No   • Drug use: No     Social History     Social History Narrative   • Not on file     FAMILY HISTORY  Family History   Problem Relation Age of Onset   • No Known Problems Mother    • GI Disease Father         liver   • Thyroid Neg Hx    • Diabetes Neg Hx      Family Status   Relation Name Status   • Mo  Alive   • Fa  Alive   • Neg Hx  (Not Specified)     ROS   Constitutional: Negative for fever, chills, fatigue.  HENT: Negative for congestion, sore throat.  Eyes: Negative for vision problems.   Respiratory: Negative for cough, shortness of breath.  Cardiovascular: Negative for chest pain, palpitations.   Gastrointestinal: Negative for heartburn, nausea, abdominal pain.   Genitourinary: Negative for dysuria.  Musculoskeletal: Negative for significant myalgia, back and joint pain.   Skin: Negative for rash.   Neuro: Negative for dizziness, weakness and headaches.   Endo/Heme/Allergies: Does not bruise/bleed easily.   Psychiatric/Behavioral: Negative for depression.    PHYSICAL EXAM   Blood Pressure 106/64 (BP Location: Left arm, Patient Position: Sitting, BP Cuff Size: Adult)   Pulse 77   Temperature 36.9 °C (98.4 °F) (Temporal)   Height 1.676 m (5' 6\")   Weight 67 kg (147 lb 12.8 oz)   Oxygen Saturation 97%   Body Mass Index 23.86 kg/m²   General:  NAD, well appearing  HEENT:   NC/AT, PERRLA, EOMI, TMs are clear. " Oropharyngeal mucosa is pink,  without lesions;  no cervical / supraclavicular  lymphadenopathy, no thyromegaly.    Cardiovascular: RRR.   No m/r/g.       Lungs:   CTAB, no w/r/r, no respiratory distress.  Abdomen: Soft, NT/ND; no hepatosplenomegaly.  Extremities:  2+ DP and radial pulses bilaterally.  No c/c/e.   Skin:  Warm, dry.  No erythema. No rash.   Neurologic: Alert & oriented x 3. CN II-XII grossly intact. No focal deficits.  Psychiatric:  Affect normal, mood normal, judgment normal.    Labs     Labs are reviewed and discussed with a patient  No results found for: CHOLSTRLTOT, LDL, HDL, TRIGLYCERIDE    Lab Results   Component Value Date/Time    SODIUM 142 11/19/2018 06:41 AM    POTASSIUM 4.0 11/19/2018 06:41 AM    CHLORIDE 107 11/19/2018 06:41 AM    CO2 27 11/19/2018 06:41 AM    GLUCOSE 93 11/19/2018 06:41 AM    BUN 25 (H) 11/19/2018 06:41 AM    CREATININE 1.23 11/19/2018 06:41 AM    CREATININE 1.0 06/15/2008 04:05 AM     Lab Results   Component Value Date/Time    ALKPHOSPHAT 53 11/19/2018 06:41 AM    ASTSGOT 14 11/19/2018 06:41 AM    ALTSGPT 12 11/19/2018 06:41 AM    TBILIRUBIN 0.4 11/19/2018 06:41 AM      Lab Results   Component Value Date/Time    HBA1C 5.7 (A) 12/02/2019    HBA1C 5.8 (A) 08/06/2019    HBA1C 6.5 (H) 11/19/2018 06:41 AM     No results found for: TSH  Lab Results   Component Value Date/Time    FREET4 1.06 11/19/2018 06:41 AM     Lab Results   Component Value Date/Time    WBC 7.4 06/15/2008 04:05 AM    RBC 3.65 (L) 06/15/2008 04:05 AM    HEMOGLOBIN 11.6 (L) 06/15/2008 04:05 AM    HEMATOCRIT 34.2 (L) 06/15/2008 04:05 AM    MCV 93.8 06/15/2008 04:05 AM    MCH 31.8 06/15/2008 04:05 AM    MCHC 33.9 06/15/2008 04:05 AM    MPV 8.4 06/15/2008 04:05 AM    NEUTSPOLYS 78.4 (H) 06/14/2008 07:10 AM    LYMPHOCYTES 15.0 (L) 06/14/2008 07:10 AM    MONOCYTES 5.4 06/14/2008 07:10 AM    EOSINOPHILS 0.5 06/14/2008 07:10 AM    BASOPHILS 0.7 06/14/2008 07:10 AM      Imaging     None    Assessment and Plan      Jacinda Pittman is a 55 y.o. female    1. IFG (impaired fasting glucose)  Discussed about risk to develop DM.   Advised low carb diet, exercise, watch for WT.   - Comp Metabolic Panel; Future  - HEMOGLOBIN A1C; Future    2. CKD (chronic kidney disease), stage III (HCC)  Stable, advised to increase fluid intake to half of the gallon per day, avoid NSAIDs  - Comp Metabolic Panel; Future    3. Acquired hypothyroidism  Asymptomatic, continue current treatment, follow-up labs  - TSH; Future  - FREE THYROXINE; Future    4. Hypovitaminosis D  Advised 2000 units of vitamin D daily, OTC  -I gave her written instructions  - VITAMIN D,25 HYDROXY; Future    5. Health care maintenance  Due Shingrix    Counseling:   - Smoking:  Nonsmoker    Followup: In 3 months, with labs    All questions are answered.    Please note that this dictation was created using voice recognition software, and that there might be errors of parth and possibly content.

## 2020-04-27 ENCOUNTER — OFFICE VISIT (OUTPATIENT)
Dept: MEDICAL GROUP | Age: 55
End: 2020-04-27
Payer: COMMERCIAL

## 2020-04-27 VITALS
BODY MASS INDEX: 23.75 KG/M2 | HEART RATE: 77 BPM | HEIGHT: 66 IN | WEIGHT: 147.8 LBS | DIASTOLIC BLOOD PRESSURE: 64 MMHG | TEMPERATURE: 98.4 F | OXYGEN SATURATION: 97 % | SYSTOLIC BLOOD PRESSURE: 106 MMHG

## 2020-04-27 DIAGNOSIS — Z00.00 HEALTH CARE MAINTENANCE: ICD-10-CM

## 2020-04-27 DIAGNOSIS — R73.01 IMPAIRED FASTING GLUCOSE: ICD-10-CM

## 2020-04-27 DIAGNOSIS — N18.30 CKD (CHRONIC KIDNEY DISEASE), STAGE III: ICD-10-CM

## 2020-04-27 DIAGNOSIS — E55.9 HYPOVITAMINOSIS D: ICD-10-CM

## 2020-04-27 DIAGNOSIS — E03.9 ACQUIRED HYPOTHYROIDISM: ICD-10-CM

## 2020-04-27 PROCEDURE — 99214 OFFICE O/P EST MOD 30 MIN: CPT | Performed by: INTERNAL MEDICINE

## 2020-07-13 LAB — HBA1C MFR BLD: 5.7 % (ref 0–5.6)

## 2020-07-15 DIAGNOSIS — R73.01 IFG (IMPAIRED FASTING GLUCOSE): ICD-10-CM

## 2020-07-15 DIAGNOSIS — E03.9 ACQUIRED HYPOTHYROIDISM: ICD-10-CM

## 2020-07-15 DIAGNOSIS — E55.9 HYPOVITAMINOSIS D: ICD-10-CM

## 2020-09-10 ENCOUNTER — OFFICE VISIT (OUTPATIENT)
Dept: MEDICAL GROUP | Age: 55
End: 2020-09-10
Payer: COMMERCIAL

## 2020-09-10 VITALS
BODY MASS INDEX: 24.14 KG/M2 | TEMPERATURE: 99.1 F | HEART RATE: 75 BPM | DIASTOLIC BLOOD PRESSURE: 72 MMHG | SYSTOLIC BLOOD PRESSURE: 100 MMHG | WEIGHT: 150.2 LBS | OXYGEN SATURATION: 96 % | HEIGHT: 66 IN

## 2020-09-10 DIAGNOSIS — N18.30 CKD (CHRONIC KIDNEY DISEASE), STAGE III: ICD-10-CM

## 2020-09-10 DIAGNOSIS — E03.9 ACQUIRED HYPOTHYROIDISM: ICD-10-CM

## 2020-09-10 DIAGNOSIS — R73.01 IMPAIRED FASTING GLUCOSE: ICD-10-CM

## 2020-09-10 DIAGNOSIS — I34.1 MVP (MITRAL VALVE PROLAPSE): ICD-10-CM

## 2020-09-10 DIAGNOSIS — E55.9 HYPOVITAMINOSIS D: ICD-10-CM

## 2020-09-10 PROCEDURE — 99214 OFFICE O/P EST MOD 30 MIN: CPT | Performed by: INTERNAL MEDICINE

## 2020-09-10 RX ORDER — ERGOCALCIFEROL 1.25 MG/1
50000 CAPSULE ORAL
Qty: 12 CAP | Refills: 1 | Status: SHIPPED | OUTPATIENT
Start: 2020-09-10

## 2020-09-10 NOTE — PROGRESS NOTES
CHIEF COMPLAINT  Chief Complaint   Patient presents with   • Follow-Up   • Lab Results     located in the Media Tab from [employer].       [facility]  Jacinda Pittman is a 55 y.o. female who presents today for the following     IFG  Interval course:  - The patient has have elevated FBG/A1c at 5.7, improved.  No polydipsia, polyphagia, polyuria.  No abdominal pain, weight loss, fatigue.  Diet: regular  Exercise: limited  BMI: 24  No medications.  FH of DM: Neg     CKD stage III  The last renal panel showed slightly abnormal GFR and creatinine and normal electrolytes.   No consistent NSAIDs use.  Fluid intake has been low.     Hypothyroidism  Levothyroxine, 75 mcg, taking daily early in am, before any po intake.  No temperature intolerance. No change in weight, hair/skin quality, BMs.   No tremors, weakness.  No peripheral swelling.  No mood changes.  FH: Neg     MVP  The patient has a remote diagnosis of MVP.   She has not here palpitations, irregular heartbeats, chest pain or pressure              -Exertional shortness of breath and chest pain.   Echocardiography was remote.     Hypovitaminosis D  The patient had low vitamin D level at 22.  Vitamin D supplement: no.    Reviewed PMH, PSH, FH, SH, ALL, HCM/IMM, no changes  Reviewed MEDS, no changes    Patient Active Problem List    Diagnosis Date Noted   • Hypovitaminosis D 03/02/2020   • CKD (chronic kidney disease), stage III (HCC) 10/14/2019   • Anemia 12/31/2018   • Polyp of colon 12/31/2018   • Acquired hypothyroidism 11/12/2018   • Health care maintenance 11/12/2018   • Perimenopause 06/15/2018   • IFG (impaired fasting glucose) 03/12/2018   • MVP (mitral valve prolapse) 03/12/2018     CURRENT MEDICATIONS  Current Outpatient Medications   Medication Sig Dispense Refill   • clindamycin (CLEOCIN) 300 MG Cap Take 1 Cap by mouth 3 times a day. 30 Cap 0   • mupirocin (BACTROBAN) 2 % Ointment Apply 1 Application to affected area(s) every day. 1 Tube 0   • levothyroxine  "(SYNTHROID) 75 MCG Tab Take 1 Tab by mouth Every morning on an empty stomach. 90 Tab 0   • levothyroxine (SYNTHROID) 75 MCG Tab Take 75 mcg by mouth every morning before breakfast.  1     No current facility-administered medications for this visit.      ALLERGIES  Allergies: Patient has no known allergies.  PAST MEDICAL HISTORY  Past Medical History:   Diagnosis Date   • CKD (chronic kidney disease), stage III (HCC) 10/14/2019   • MVP (mitral valve prolapse) 3/12/2018   • Hypothyroid      SURGICAL HISTORY  She  has no past surgical history on file.  SOCIAL HISTORY  Social History     Tobacco Use   • Smoking status: Never Smoker   • Smokeless tobacco: Never Used   Substance Use Topics   • Alcohol use: No   • Drug use: No     Social History     Social History Narrative   • Not on file     FAMILY HISTORY  Family History   Problem Relation Age of Onset   • No Known Problems Mother    • GI Disease Father         liver   • Thyroid Neg Hx    • Diabetes Neg Hx      Family Status   Relation Name Status   • Mo  Alive   • Fa  Alive   • Neg Hx  (Not Specified)     ROS   Constitutional: Negative for fever, chills, fatigue.  HENT: Negative for congestion, sore throat.  Eyes: Negative for vision problems.   Respiratory: Negative for cough, shortness of breath.  Cardiovascular: Negative for chest pain, palpitations.   Gastrointestinal: Negative for heartburn, nausea, abdominal pain.   Genitourinary: Negative for dysuria.  Musculoskeletal: Negative for significant myalgia, back and joint pain.   Skin: Negative for rash.   Neuro: Negative for dizziness, weakness and headaches.   Endo/Heme/Allergies: Does not bruise/bleed easily.   Psychiatric/Behavioral: Negative for depression.    PHYSICAL EXAM   Blood Pressure 100/72 (BP Location: Left arm, Patient Position: Sitting, BP Cuff Size: Adult)   Pulse 75   Temperature 37.3 °C (99.1 °F) (Temporal)   Height 1.676 m (5' 6\")   Weight 68.1 kg (150 lb 3.2 oz)   Oxygen Saturation 96%  Body " mass index is 24.24 kg/m².  General:  NAD, well appearing  HEENT:   NC/AT, PERRLA, EOMI.  Cardiovascular: unlabored breathing, no peripheral cyanosis or swelling.     Lungs:   no respiratory distress.  Abdomen: non- distended.  Extremities:  No LE swelling.  Skin:  Warm, dry.  No erythema. No rash.   Neurologic: Alert & oriented x 3. CN II-XII grossly intact. No focal deficits.  Psychiatric:  Affect normal, mood normal, judgment normal.    Labs     Labs are reviewed and discussed with a patient  No results found for: CHOLSTRLTOT, LDL, HDL, TRIGLYCERIDE    Lab Results   Component Value Date/Time    SODIUM 142 11/19/2018 06:41 AM    POTASSIUM 4.0 11/19/2018 06:41 AM    CHLORIDE 107 11/19/2018 06:41 AM    CO2 27 11/19/2018 06:41 AM    GLUCOSE 93 11/19/2018 06:41 AM    BUN 25 (H) 11/19/2018 06:41 AM    CREATININE 1.23 11/19/2018 06:41 AM    CREATININE 1.0 06/15/2008 04:05 AM     Lab Results   Component Value Date/Time    ALKPHOSPHAT 53 11/19/2018 06:41 AM    ASTSGOT 14 11/19/2018 06:41 AM    ALTSGPT 12 11/19/2018 06:41 AM    TBILIRUBIN 0.4 11/19/2018 06:41 AM      Lab Results   Component Value Date/Time    HBA1C 5.7 (A) 07/13/2020    HBA1C 5.7 (A) 12/02/2019    HBA1C 5.8 (A) 08/06/2019     No results found for: TSH  Lab Results   Component Value Date/Time    FREET4 1.06 11/19/2018 06:41 AM       Lab Results   Component Value Date/Time    WBC 7.4 06/15/2008 04:05 AM    RBC 3.65 (L) 06/15/2008 04:05 AM    HEMOGLOBIN 11.6 (L) 06/15/2008 04:05 AM    HEMATOCRIT 34.2 (L) 06/15/2008 04:05 AM    MCV 93.8 06/15/2008 04:05 AM    MCH 31.8 06/15/2008 04:05 AM    MCHC 33.9 06/15/2008 04:05 AM    MPV 8.4 06/15/2008 04:05 AM    NEUTSPOLYS 78.4 (H) 06/14/2008 07:10 AM    LYMPHOCYTES 15.0 (L) 06/14/2008 07:10 AM    MONOCYTES 5.4 06/14/2008 07:10 AM    EOSINOPHILS 0.5 06/14/2008 07:10 AM    BASOPHILS 0.7 06/14/2008 07:10 AM      Imaging     None    Assessment and Plan     Jacinda Pittman is a 55 y.o. female    1. IFG (impaired  fasting glucose)  Improved, continue current lifestyle  - Comp Metabolic Panel; Future  -A1c, Future  -Microalbumin, Future    2. CKD (chronic kidney disease), stage III (HCC)  Resolved  - Comp Metabolic Panel; Future    3. Acquired hypothyroidism  Controlled, continue current treatment  - FREE THYROXINE; Future  - TSH; Future  - THYROID PEROXIDASE  (TPO) AB; Future    4. MVP (mitral valve prolapse)  Asymptomatic    5. Hypovitaminosis D  Start high-dose vitamin D  - vitamin D, Ergocalciferol, (DRISDOL) 1.25 MG (59136 UT) Cap capsule; Take 1 Cap by mouth every 7 days.  Dispense: 12 Cap; Refill: 1  - VITAMIN D,25 HYDROXY; Future    Counseling:   - Smoking:  Nonsmoker    Followup: In 4 months    All questions are answered.    Please note that this dictation was created using voice recognition software, and that there might be errors of parth and possibly content.

## 2020-12-21 RX ORDER — LEVOTHYROXINE SODIUM 0.07 MG/1
75 TABLET ORAL
Qty: 90 TAB | Refills: 3 | Status: SHIPPED | OUTPATIENT
Start: 2020-12-21 | End: 2021-12-22

## 2021-01-11 ENCOUNTER — OFFICE VISIT (OUTPATIENT)
Dept: MEDICAL GROUP | Age: 56
End: 2021-01-11
Payer: COMMERCIAL

## 2021-01-11 VITALS
WEIGHT: 152 LBS | HEIGHT: 66 IN | TEMPERATURE: 99.3 F | SYSTOLIC BLOOD PRESSURE: 92 MMHG | DIASTOLIC BLOOD PRESSURE: 62 MMHG | HEART RATE: 71 BPM | OXYGEN SATURATION: 97 % | BODY MASS INDEX: 24.43 KG/M2

## 2021-01-11 DIAGNOSIS — N18.30 STAGE 3 CHRONIC KIDNEY DISEASE, UNSPECIFIED WHETHER STAGE 3A OR 3B CKD: ICD-10-CM

## 2021-01-11 DIAGNOSIS — R73.01 IMPAIRED FASTING GLUCOSE: ICD-10-CM

## 2021-01-11 DIAGNOSIS — Z12.31 ENCOUNTER FOR SCREENING MAMMOGRAM FOR MALIGNANT NEOPLASM OF BREAST: ICD-10-CM

## 2021-01-11 DIAGNOSIS — E03.9 ACQUIRED HYPOTHYROIDISM: ICD-10-CM

## 2021-01-11 DIAGNOSIS — E55.9 HYPOVITAMINOSIS D: ICD-10-CM

## 2021-01-11 PROCEDURE — 99214 OFFICE O/P EST MOD 30 MIN: CPT | Performed by: INTERNAL MEDICINE

## 2021-01-11 ASSESSMENT — PATIENT HEALTH QUESTIONNAIRE - PHQ9: CLINICAL INTERPRETATION OF PHQ2 SCORE: 0

## 2021-01-11 NOTE — PROGRESS NOTES
CHIEF COMPLAINT  IFG, labs    HPI  Jacinda Pittman is a 55 y.o. female who presents today for the following     IFG  Interval course:  - The patient has have elevated FBG/A1c at 5.7, improved.  No polydipsia, polyphagia, polyuria.  No abdominal pain, weight loss, fatigue.  Diet: regular  Exercise: limited  BMI: 24  No medications.  FH of DM: Neg     CKD stage III  The last renal panel showed slightly abnormal GFR and creatinine and normal electrolytes.   No consistent NSAIDs use.  Fluid intake has been low.     Hypothyroidism  Levothyroxine, 75 mcg, taking daily early in am, before any po intake.  No temperature intolerance. No change in weight, hair/skin quality, BMs.   No tremors, weakness.  No peripheral swelling.  No mood changes.  FH: Neg     Hypovitaminosis D  The patient had low vitamin D level.  Vitamin D supplement: high dose.    Reviewed PMH, PSH, FH, SH, ALL, HCM/IMM, no changes  Reviewed MEDS, no changes    Patient Active Problem List    Diagnosis Date Noted   • Hypovitaminosis D 03/02/2020   • CKD (chronic kidney disease), stage III 10/14/2019   • Anemia 12/31/2018   • Polyp of colon 12/31/2018   • Acquired hypothyroidism 11/12/2018   • Health care maintenance 11/12/2018   • Perimenopause 06/15/2018   • IFG (impaired fasting glucose) 03/12/2018   • MVP (mitral valve prolapse) 03/12/2018     CURRENT MEDICATIONS  Current Outpatient Medications   Medication Sig Dispense Refill   • levothyroxine (SYNTHROID) 75 MCG Tab Take 1 Tab by mouth every morning before breakfast. 90 Tab 3   • vitamin D, Ergocalciferol, (DRISDOL) 1.25 MG (90973 UT) Cap capsule Take 1 Cap by mouth every 7 days. 12 Cap 1   • clindamycin (CLEOCIN) 300 MG Cap Take 1 Cap by mouth 3 times a day. 30 Cap 0   • mupirocin (BACTROBAN) 2 % Ointment Apply 1 Application to affected area(s) every day. 1 Tube 0   • levothyroxine (SYNTHROID) 75 MCG Tab Take 1 Tab by mouth Every morning on an empty stomach. 90 Tab 0     No current  "facility-administered medications for this visit.      ALLERGIES  Allergies: Patient has no known allergies.  PAST MEDICAL HISTORY  Past Medical History:   Diagnosis Date   • CKD (chronic kidney disease), stage III (HCC) 10/14/2019   • MVP (mitral valve prolapse) 3/12/2018   • Hypothyroid      SURGICAL HISTORY  She  has no past surgical history on file.  SOCIAL HISTORY  Social History     Tobacco Use   • Smoking status: Never Smoker   • Smokeless tobacco: Never Used   Substance Use Topics   • Alcohol use: No   • Drug use: No     Social History     Social History Narrative   • Not on file     FAMILY HISTORY  Family History   Problem Relation Age of Onset   • No Known Problems Mother    • GI Disease Father         liver   • Thyroid Neg Hx    • Diabetes Neg Hx      Family Status   Relation Name Status   • Mo  Alive   • Fa  Alive   • Neg Hx  (Not Specified)     ROS   Constitutional: Negative for fever, chills, fatigue.  HENT: Negative for congestion, sore throat.  Eyes: Negative for vision problems.   Respiratory: Negative for cough, shortness of breath.  Cardiovascular: Negative for chest pain, palpitations.   Gastrointestinal: Negative for heartburn, nausea, abdominal pain.   Genitourinary: Negative for dysuria.  Musculoskeletal: Negative for significant myalgia, back and joint pain.   Skin: Negative for rash.   Neuro: Negative for dizziness, weakness and headaches.   Endo/Heme/Allergies: Does not bruise/bleed easily.   Psychiatric/Behavioral: Negative for depression.    PHYSICAL EXAM   Blood Pressure (Abnormal) 92/62 (BP Location: Left arm, Patient Position: Sitting, BP Cuff Size: Adult)   Pulse 71   Temperature 37.4 °C (99.3 °F) (Temporal)   Height 1.676 m (5' 6\")   Weight 68.9 kg (152 lb)   Oxygen Saturation 97%   Body Mass Index 24.53 kg/m²   General:  NAD, well appearing  HEENT:   NC/AT, PERRLA, EOMI.  Cardiovascular: unlabored breathing, no peripheral cyanosis or swelling.     Lungs:   no respiratory " distress.  Abdomen: non- distended.  Extremities:  No LE swelling.  Skin:  Warm, dry.  No erythema. No rash.   Neurologic: Alert & oriented x 3. CN II-XII grossly intact. No focal deficits.  Psychiatric:  Affect normal, mood normal, judgment normal.    Labs     Labs are reviewed and discussed with a patient  No results found for: CHOLSTRLTOT, LDL, HDL, TRIGLYCERIDE    Lab Results   Component Value Date/Time    SODIUM 142 11/19/2018 06:41 AM    POTASSIUM 4.0 11/19/2018 06:41 AM    CHLORIDE 107 11/19/2018 06:41 AM    CO2 27 11/19/2018 06:41 AM    GLUCOSE 93 11/19/2018 06:41 AM    BUN 25 (H) 11/19/2018 06:41 AM    CREATININE 1.23 11/19/2018 06:41 AM    CREATININE 1.0 06/15/2008 04:05 AM     Lab Results   Component Value Date/Time    ALKPHOSPHAT 53 11/19/2018 06:41 AM    ASTSGOT 14 11/19/2018 06:41 AM    ALTSGPT 12 11/19/2018 06:41 AM    TBILIRUBIN 0.4 11/19/2018 06:41 AM      Lab Results   Component Value Date/Time    HBA1C 5.7 (A) 07/13/2020    HBA1C 5.7 (A) 12/02/2019    HBA1C 5.8 (A) 08/06/2019     No results found for: TSH  Lab Results   Component Value Date/Time    FREET4 1.06 11/19/2018 06:41 AM       Lab Results   Component Value Date/Time    WBC 7.4 06/15/2008 04:05 AM    RBC 3.65 (L) 06/15/2008 04:05 AM    HEMOGLOBIN 11.6 (L) 06/15/2008 04:05 AM    HEMATOCRIT 34.2 (L) 06/15/2008 04:05 AM    MCV 93.8 06/15/2008 04:05 AM    MCH 31.8 06/15/2008 04:05 AM    MCHC 33.9 06/15/2008 04:05 AM    MPV 8.4 06/15/2008 04:05 AM    NEUTSPOLYS 78.4 (H) 06/14/2008 07:10 AM    LYMPHOCYTES 15.0 (L) 06/14/2008 07:10 AM    MONOCYTES 5.4 06/14/2008 07:10 AM    EOSINOPHILS 0.5 06/14/2008 07:10 AM    BASOPHILS 0.7 06/14/2008 07:10 AM      Imaging     None    Assessment and Plan     Jacinda Pittman is a 55 y.o. female    1. IFG (impaired fasting glucose)  Discussed about risk to develop DM.   Advised low carb diet, exercise, watch for WT.     2. Stage 3 chronic kidney disease, unspecified whether stage 3a or 3b CKD  Advised to  have good fluid intake, avoid NSAIDs.    3. Acquired hypothyroidism  Asymptomatic, continue current treatment, pending labs    4. Hypovitaminosis D  Pending labs, continue current supplement    5. Encounter for screening mammogram for malignant neoplasm of breast  - MA-SCREENING MAMMO BILAT W/TOMOSYNTHESIS W/CAD; Future    Counseling:   - Smoking:  Nonsmoker    Followup: Within 1 month, with labs    All questions are answered.    Please note that this dictation was created using voice recognition software, and that there might be errors of parth and possibly content.

## 2021-03-04 ENCOUNTER — HOSPITAL ENCOUNTER (OUTPATIENT)
Dept: LAB | Facility: MEDICAL CENTER | Age: 56
End: 2021-03-04
Attending: INTERNAL MEDICINE
Payer: COMMERCIAL

## 2021-03-04 DIAGNOSIS — N18.30 CKD (CHRONIC KIDNEY DISEASE), STAGE III: ICD-10-CM

## 2021-03-04 DIAGNOSIS — R73.01 IMPAIRED FASTING GLUCOSE: ICD-10-CM

## 2021-03-04 DIAGNOSIS — E55.9 HYPOVITAMINOSIS D: ICD-10-CM

## 2021-03-04 DIAGNOSIS — E03.9 ACQUIRED HYPOTHYROIDISM: ICD-10-CM

## 2021-03-04 LAB
25(OH)D3 SERPL-MCNC: 17 NG/ML (ref 30–100)
ALBUMIN SERPL BCP-MCNC: 4 G/DL (ref 3.2–4.9)
ALBUMIN/GLOB SERPL: 1.4 G/DL
ALP SERPL-CCNC: 58 U/L (ref 30–99)
ALT SERPL-CCNC: 16 U/L (ref 2–50)
ANION GAP SERPL CALC-SCNC: 9 MMOL/L (ref 7–16)
AST SERPL-CCNC: 17 U/L (ref 12–45)
BILIRUB SERPL-MCNC: 0.5 MG/DL (ref 0.1–1.5)
BUN SERPL-MCNC: 25 MG/DL (ref 8–22)
CALCIUM SERPL-MCNC: 8.8 MG/DL (ref 8.5–10.5)
CHLORIDE SERPL-SCNC: 105 MMOL/L (ref 96–112)
CO2 SERPL-SCNC: 26 MMOL/L (ref 20–33)
CREAT SERPL-MCNC: 1.06 MG/DL (ref 0.5–1.4)
CREAT UR-MCNC: 146.9 MG/DL
EST. AVERAGE GLUCOSE BLD GHB EST-MCNC: 126 MG/DL
GLOBULIN SER CALC-MCNC: 2.9 G/DL (ref 1.9–3.5)
GLUCOSE SERPL-MCNC: 85 MG/DL (ref 65–99)
HBA1C MFR BLD: 6 % (ref 4–5.6)
MICROALBUMIN UR-MCNC: 1.2 MG/DL
MICROALBUMIN/CREAT UR: 8 MG/G (ref 0–30)
POTASSIUM SERPL-SCNC: 4.2 MMOL/L (ref 3.6–5.5)
PROT SERPL-MCNC: 6.9 G/DL (ref 6–8.2)
SODIUM SERPL-SCNC: 140 MMOL/L (ref 135–145)
T4 FREE SERPL-MCNC: 0.52 NG/DL (ref 0.93–1.7)
THYROPEROXIDASE AB SERPL-ACNC: 200 IU/ML (ref 0–9)
TSH SERPL DL<=0.005 MIU/L-ACNC: 21.4 UIU/ML (ref 0.38–5.33)

## 2021-03-04 PROCEDURE — 84439 ASSAY OF FREE THYROXINE: CPT

## 2021-03-04 PROCEDURE — 80053 COMPREHEN METABOLIC PANEL: CPT

## 2021-03-04 PROCEDURE — 82570 ASSAY OF URINE CREATININE: CPT

## 2021-03-04 PROCEDURE — 86376 MICROSOMAL ANTIBODY EACH: CPT

## 2021-03-04 PROCEDURE — 82306 VITAMIN D 25 HYDROXY: CPT

## 2021-03-04 PROCEDURE — 82043 UR ALBUMIN QUANTITATIVE: CPT

## 2021-03-04 PROCEDURE — 84443 ASSAY THYROID STIM HORMONE: CPT

## 2021-03-04 PROCEDURE — 36415 COLL VENOUS BLD VENIPUNCTURE: CPT

## 2021-03-04 PROCEDURE — 83036 HEMOGLOBIN GLYCOSYLATED A1C: CPT

## 2021-05-12 ENCOUNTER — IMMUNIZATION (OUTPATIENT)
Dept: FAMILY PLANNING/WOMEN'S HEALTH CLINIC | Facility: IMMUNIZATION CENTER | Age: 56
End: 2021-05-12
Payer: COMMERCIAL

## 2021-05-12 DIAGNOSIS — Z23 ENCOUNTER FOR VACCINATION: Primary | ICD-10-CM

## 2021-05-12 PROCEDURE — 0001A PFIZER SARS-COV-2 VACCINE: CPT

## 2021-05-12 PROCEDURE — 91300 PFIZER SARS-COV-2 VACCINE: CPT

## 2021-12-22 RX ORDER — LEVOTHYROXINE SODIUM 0.07 MG/1
TABLET ORAL
Qty: 30 TABLET | Refills: 0 | Status: SHIPPED | OUTPATIENT
Start: 2021-12-22 | End: 2022-01-18

## 2022-01-18 RX ORDER — LEVOTHYROXINE SODIUM 0.07 MG/1
TABLET ORAL
Qty: 30 TABLET | Refills: 0 | Status: SHIPPED | OUTPATIENT
Start: 2022-01-18 | End: 2022-03-23

## 2022-03-23 RX ORDER — LEVOTHYROXINE SODIUM 0.07 MG/1
TABLET ORAL
Qty: 30 TABLET | Refills: 0 | Status: SHIPPED | OUTPATIENT
Start: 2022-03-23 | End: 2022-04-28

## 2022-03-23 NOTE — TELEPHONE ENCOUNTER
Received request via: Pharmacy    Was the patient seen in the last year in this department? No 1/11/21    Does the patient have an active prescription (recently filled or refills available) for medication(s) requested? No

## 2022-04-28 DIAGNOSIS — E03.9 ACQUIRED HYPOTHYROIDISM: ICD-10-CM

## 2022-04-28 RX ORDER — LEVOTHYROXINE SODIUM 0.07 MG/1
TABLET ORAL
Qty: 30 TABLET | Refills: 0 | Status: SHIPPED | OUTPATIENT
Start: 2022-04-28